# Patient Record
Sex: FEMALE | Race: WHITE | NOT HISPANIC OR LATINO | Employment: FULL TIME | URBAN - METROPOLITAN AREA
[De-identification: names, ages, dates, MRNs, and addresses within clinical notes are randomized per-mention and may not be internally consistent; named-entity substitution may affect disease eponyms.]

---

## 2019-02-15 ENCOUNTER — HOSPITAL ENCOUNTER (OUTPATIENT)
Dept: RADIOLOGY | Facility: MEDICAL CENTER | Age: 54
End: 2019-02-15
Attending: FAMILY MEDICINE
Payer: COMMERCIAL

## 2019-02-15 DIAGNOSIS — J20.9 ACUTE BRONCHITIS, UNSPECIFIED ORGANISM: ICD-10-CM

## 2019-02-15 PROCEDURE — 71046 X-RAY EXAM CHEST 2 VIEWS: CPT

## 2019-03-10 ENCOUNTER — APPOINTMENT (OUTPATIENT)
Dept: RADIOLOGY | Facility: IMAGING CENTER | Age: 54
End: 2019-03-10
Attending: NURSE PRACTITIONER
Payer: COMMERCIAL

## 2019-03-10 ENCOUNTER — APPOINTMENT (OUTPATIENT)
Dept: RADIOLOGY | Facility: MEDICAL CENTER | Age: 54
End: 2019-03-10
Attending: EMERGENCY MEDICINE
Payer: COMMERCIAL

## 2019-03-10 ENCOUNTER — HOSPITAL ENCOUNTER (EMERGENCY)
Facility: MEDICAL CENTER | Age: 54
End: 2019-03-10
Attending: EMERGENCY MEDICINE
Payer: COMMERCIAL

## 2019-03-10 ENCOUNTER — OFFICE VISIT (OUTPATIENT)
Dept: URGENT CARE | Facility: PHYSICIAN GROUP | Age: 54
End: 2019-03-10
Payer: COMMERCIAL

## 2019-03-10 VITALS
SYSTOLIC BLOOD PRESSURE: 128 MMHG | HEIGHT: 64 IN | HEART RATE: 88 BPM | BODY MASS INDEX: 40.97 KG/M2 | RESPIRATION RATE: 18 BRPM | TEMPERATURE: 99.3 F | DIASTOLIC BLOOD PRESSURE: 90 MMHG | WEIGHT: 240 LBS | OXYGEN SATURATION: 96 %

## 2019-03-10 VITALS
SYSTOLIC BLOOD PRESSURE: 135 MMHG | OXYGEN SATURATION: 97 % | HEIGHT: 64 IN | DIASTOLIC BLOOD PRESSURE: 52 MMHG | TEMPERATURE: 97.9 F | WEIGHT: 238.1 LBS | RESPIRATION RATE: 19 BRPM | HEART RATE: 65 BPM | BODY MASS INDEX: 40.65 KG/M2

## 2019-03-10 DIAGNOSIS — R06.02 SHORTNESS OF BREATH: ICD-10-CM

## 2019-03-10 LAB
ALBUMIN SERPL BCP-MCNC: 3.9 G/DL (ref 3.2–4.9)
ALBUMIN/GLOB SERPL: 1.2 G/DL
ALP SERPL-CCNC: 62 U/L (ref 30–99)
ALT SERPL-CCNC: 35 U/L (ref 2–50)
ANION GAP SERPL CALC-SCNC: 8 MMOL/L (ref 0–11.9)
AST SERPL-CCNC: 34 U/L (ref 12–45)
BASOPHILS # BLD AUTO: 0.4 % (ref 0–1.8)
BASOPHILS # BLD: 0.04 K/UL (ref 0–0.12)
BILIRUB SERPL-MCNC: 0.7 MG/DL (ref 0.1–1.5)
BNP SERPL-MCNC: 33 PG/ML (ref 0–100)
BUN SERPL-MCNC: 11 MG/DL (ref 8–22)
CALCIUM SERPL-MCNC: 8.9 MG/DL (ref 8.4–10.2)
CHLORIDE SERPL-SCNC: 105 MMOL/L (ref 96–112)
CO2 SERPL-SCNC: 21 MMOL/L (ref 20–33)
CREAT SERPL-MCNC: 0.88 MG/DL (ref 0.5–1.4)
EOSINOPHIL # BLD AUTO: 0.26 K/UL (ref 0–0.51)
EOSINOPHIL NFR BLD: 2.5 % (ref 0–6.9)
ERYTHROCYTE [DISTWIDTH] IN BLOOD BY AUTOMATED COUNT: 43.7 FL (ref 35.9–50)
GLOBULIN SER CALC-MCNC: 3.3 G/DL (ref 1.9–3.5)
GLUCOSE SERPL-MCNC: 96 MG/DL (ref 65–99)
HCT VFR BLD AUTO: 45.2 % (ref 37–47)
HGB BLD-MCNC: 15.2 G/DL (ref 12–16)
IMM GRANULOCYTES # BLD AUTO: 0.02 K/UL (ref 0–0.11)
IMM GRANULOCYTES NFR BLD AUTO: 0.2 % (ref 0–0.9)
LYMPHOCYTES # BLD AUTO: 2.98 K/UL (ref 1–4.8)
LYMPHOCYTES NFR BLD: 28.3 % (ref 22–41)
MCH RBC QN AUTO: 29.3 PG (ref 27–33)
MCHC RBC AUTO-ENTMCNC: 33.6 G/DL (ref 33.6–35)
MCV RBC AUTO: 87.3 FL (ref 81.4–97.8)
MONOCYTES # BLD AUTO: 0.63 K/UL (ref 0–0.85)
MONOCYTES NFR BLD AUTO: 6 % (ref 0–13.4)
NEUTROPHILS # BLD AUTO: 6.6 K/UL (ref 2–7.15)
NEUTROPHILS NFR BLD: 62.6 % (ref 44–72)
NRBC # BLD AUTO: 0 K/UL
NRBC BLD-RTO: 0 /100 WBC
PLATELET # BLD AUTO: 312 K/UL (ref 164–446)
PMV BLD AUTO: 9.6 FL (ref 9–12.9)
POTASSIUM SERPL-SCNC: 3.6 MMOL/L (ref 3.6–5.5)
PROT SERPL-MCNC: 7.2 G/DL (ref 6–8.2)
RBC # BLD AUTO: 5.18 M/UL (ref 4.2–5.4)
SODIUM SERPL-SCNC: 134 MMOL/L (ref 135–145)
TROPONIN I SERPL-MCNC: <0.02 NG/ML (ref 0–0.04)
WBC # BLD AUTO: 10.5 K/UL (ref 4.8–10.8)

## 2019-03-10 PROCEDURE — 94640 AIRWAY INHALATION TREATMENT: CPT | Performed by: NURSE PRACTITIONER

## 2019-03-10 PROCEDURE — 83880 ASSAY OF NATRIURETIC PEPTIDE: CPT

## 2019-03-10 PROCEDURE — 80053 COMPREHEN METABOLIC PANEL: CPT

## 2019-03-10 PROCEDURE — 99284 EMERGENCY DEPT VISIT MOD MDM: CPT

## 2019-03-10 PROCEDURE — 700117 HCHG RX CONTRAST REV CODE 255: Performed by: EMERGENCY MEDICINE

## 2019-03-10 PROCEDURE — 71275 CT ANGIOGRAPHY CHEST: CPT

## 2019-03-10 PROCEDURE — 85025 COMPLETE CBC W/AUTO DIFF WBC: CPT

## 2019-03-10 PROCEDURE — 71046 X-RAY EXAM CHEST 2 VIEWS: CPT | Mod: TC | Performed by: NURSE PRACTITIONER

## 2019-03-10 PROCEDURE — 36415 COLL VENOUS BLD VENIPUNCTURE: CPT

## 2019-03-10 PROCEDURE — 99213 OFFICE O/P EST LOW 20 MIN: CPT | Mod: 25 | Performed by: NURSE PRACTITIONER

## 2019-03-10 PROCEDURE — 93005 ELECTROCARDIOGRAM TRACING: CPT | Performed by: EMERGENCY MEDICINE

## 2019-03-10 PROCEDURE — 84484 ASSAY OF TROPONIN QUANT: CPT

## 2019-03-10 PROCEDURE — 93000 ELECTROCARDIOGRAM COMPLETE: CPT | Performed by: NURSE PRACTITIONER

## 2019-03-10 RX ORDER — METHYLPREDNISOLONE 4 MG/1
TABLET ORAL
COMMUNITY
Start: 2019-02-15 | End: 2019-04-07

## 2019-03-10 RX ORDER — LEVOFLOXACIN 500 MG/1
TABLET, FILM COATED ORAL
COMMUNITY
Start: 2019-02-15 | End: 2019-04-07

## 2019-03-10 RX ORDER — BENZONATATE 100 MG/1
CAPSULE ORAL
COMMUNITY
Start: 2019-02-15 | End: 2019-04-07

## 2019-03-10 RX ORDER — IPRATROPIUM BROMIDE AND ALBUTEROL SULFATE 2.5; .5 MG/3ML; MG/3ML
3 SOLUTION RESPIRATORY (INHALATION) ONCE
Status: COMPLETED | OUTPATIENT
Start: 2019-03-10 | End: 2019-03-10

## 2019-03-10 RX ORDER — FLUCONAZOLE 150 MG/1
TABLET ORAL
COMMUNITY
Start: 2019-02-15 | End: 2019-04-07

## 2019-03-10 RX ADMIN — IOHEXOL 55 ML: 350 INJECTION, SOLUTION INTRAVENOUS at 19:45

## 2019-03-10 RX ADMIN — IPRATROPIUM BROMIDE AND ALBUTEROL SULFATE 3 ML: 2.5; .5 SOLUTION RESPIRATORY (INHALATION) at 17:16

## 2019-03-11 NOTE — ED NOTES
Pt rounding upon return from ct, vs as charted. Continues w/o s/s of distress. Call light in reach, await results

## 2019-03-11 NOTE — ED NOTES
Assumed care of pt-plan of care reviewed with pt to include ct , saline lock and blood draw. Blood to lab, call light in reach, in no apparent distress, vs as charted

## 2019-03-11 NOTE — DISCHARGE INSTRUCTIONS
Shortness of Breath, Adult  Shortness of breath is when a person has trouble breathing enough air, or when a person feels like she or he is having trouble breathing in enough air. Shortness of breath could be a sign of medical problem.  Follow these instructions at home:  Pay attention to any changes in your symptoms. Take these actions to help with your condition:  · Do not smoke. Smoking is a common cause of shortness of breath. If you smoke and you need help quitting, ask your health care provider.  · Avoid things that can irritate your airways, such as:  ¨ Mold.  ¨ Dust.  ¨ Air pollution.  ¨ Chemical fumes.  ¨ Things that can cause allergy symptoms (allergens), if you have allergies.  · Keep your living space clean and free of mold and dust.  · Rest as needed. Slowly return to your usual activities.  · Take over-the-counter and prescription medicines, including oxygen and inhaled medicines, only as told by your health care provider.  · Keep all follow-up visits as told by your health care provider. This is important.  Contact a health care provider if:  · Your condition does not improve as soon as expected.  · You have a hard time doing your normal activities, even after you rest.  · You have new symptoms.  Get help right away if:  · Your shortness of breath gets worse.  · You have shortness of breath when you are resting.  · You feel light-headed or you faint.  · You have a cough that is not controlled with medicines.  · You cough up blood.  · You have pain with breathing.  · You have pain in your chest, arms, shoulders, or abdomen.  · You have a fever.  · You cannot walk up stairs or exercise the way that you normally do.  This information is not intended to replace advice given to you by your health care provider. Make sure you discuss any questions you have with your health care provider.  Document Released: 09/12/2002 Document Revised: 07/08/2017 Document Reviewed: 05/25/2017  [employer] Patient  Education © 2017 [employer]

## 2019-03-11 NOTE — ED NOTES
"This pt is referred to our facility from [facility] for further evaluation and management of exertional dyspnea, and while at rest worsening for the past 2 days.  She states a Hx of Asthma with frequent exacerbations.  Chief Complaint   Patient presents with   • Asthma   • Shortness of Breath     /52   Pulse 78   Temp 36.6 °C (97.9 °F) (Temporal)   Resp 20   Ht 1.626 m (5' 4\")   Wt 108 kg (238 lb 1.6 oz)   LMP 03/03/2019 (Approximate)   SpO2 99%   BMI 40.87 kg/m²       "

## 2019-03-11 NOTE — PROGRESS NOTES
Chief Complaint   Patient presents with   • Shortness of Breath     Pt feels SOB with minor activity.        HISTORY OF PRESENT ILLNESS: Patient is a 53 y.o. female who presents to urgent care today with complaints of shortness of breath for the past two days. She admits to shortness of breath at rest and with activity. She became lightheaded this afternoon and almost passed out.  This concerned her and decided to come to urgent care at that point for further evaluation.  She denies any specific chest pain that does note bilateral pressure and difficulty taking a full deep breath.  She denies associated fever, cough, URI symptoms, leg pain, leg swelling.  She admits to a history of asthma, this does not feel similar.  She has been using her inhaler without improvement of her symptoms.  She does admit to taking a long road trip last month.  She does not smoke or take hormone therapy.  She denies chance of pregnancy, was tested for pregnancy last week which was negative.  And became concerned.  She denies any significant cardiac history.        There are no active problems to display for this patient.      Allergies:Erythromycin    Current Outpatient Prescriptions Ordered in [facility]   Medication Sig Dispense Refill   • PROAIR  (90 Base) MCG/ACT Aero Soln inhalation aerosol      • benzonatate (TESSALON) 100 MG Cap      • fluconazole (DIFLUCAN) 150 MG tablet      • levoFLOXacin (LEVAQUIN) 500 MG tablet      • MethylPREDNISolone (MEDROL DOSEPAK) 4 MG Tablet Therapy Pack      • doxycycline (VIBRAMYCIN) 100 MG Tab Take 1 Tab by mouth 2 times a day. 20 Tab 0     No current Epic-ordered facility-administered medications on file.        History reviewed. No pertinent past medical history.    Social History   Substance Use Topics   • Smoking status: Never Smoker   • Smokeless tobacco: Not on file   • Alcohol use Yes       No family status information on file.     Family History   Problem Relation Age of Onset   • Stroke  "Neg Hx    • Heart Disease Neg Hx        ROS:  Review of Systems   Constitutional: Negative for fever, chills, weight loss, malaise, and fatigue.   HENT: Negative for ear pain, nosebleeds, congestion, sore throat and neck pain.    Eyes: Negative for vision changes.   Neuro: Positive for weakness and near syncope today.  Negative for headache, sensory changes, weakness, seizure, LOC.   Cardiovascular: Negative for chest pain, palpitations, orthopnea and leg swelling.   Respiratory: Positive for shortness of breath.  Negative for cough, sputum production, and wheezing.   Gastrointestinal: Negative for abdominal pain, nausea, vomiting or diarrhea.   Genitourinary: Negative for dysuria, urgency and frequency.  Musculoskeletal: Negative for falls, neck pain, back pain, joint pain, myalgias.   Skin: Negative for rash, diaphoresis.     Exam:  Blood pressure 128/90, pulse 88, temperature 37.4 °C (99.3 °F), temperature source Temporal, resp. rate 18, height 1.626 m (5' 4\"), weight 108.9 kg (240 lb), SpO2 96 %.  General: well-nourished, well-developed female in NAD  Head: normocephalic, atraumatic  Eyes: PERRLA, no conjunctival injection, acuity grossly intact, lids normal.  Ears: normal shape and symmetry, no tenderness, no discharge. External canals are without any significant edema or erythema. Tympanic membranes are without any inflammation, no effusion. Gross auditory acuity is intact.  Nose: symmetrical without tenderness, no discharge.  Mouth/Throat: reasonable hygiene, no erythema, exudates or tonsillar enlargement.  Neck: no masses, range of motion within normal limits, no tracheal deviation. No obvious thyroid enlargement.   Lymph: no cervical adenopathy. No supraclavicular adenopathy.   Neuro: alert and oriented. Cranial nerves 1-12 grossly intact. No sensory deficit.   Cardiovascular: regular rate and rhythm. No edema.  Pulmonary: Chest is symmetrical with respiration, no wheezes, crackles, or rhonchi.  Patient is " "speaking in short sentences, without distress.  Musculoskeletal: no clubbing, appropriate muscle tone, gait is stable.  Skin: warm, dry, intact, no clubbing, no cyanosis, no rashes.   Psych: appropriate mood, affect, judgement.         Assessment/Plan:  1. Shortness of breath  ipratropium-albuterol (DUONEB) nebulizer solution    DX-CHEST-2 VIEWS    EKG         12-lead study EKG interpretation, interpreted by myself.  The rhythm is sinus with a rate of 67.  There is no ectopy. There are no acute ST segment changes and no T wave abnormalities.  Interpretation: No ST segment elevation myocardial infarction.      DX chest reviewed by myself, radiology reading \"No acute cardiopulmonary findings.\"        Patient is a pleasant 53-year-old female who presents to the clinic today with several days of shortness of breath.  She does have a history of asthma but feels that this is not similar.  She is given a DuoNeb in clinic without improvement of her symptoms.  Her chest x-ray is negative today for any obvious abnormalities.  Her twelve-lead EKG is without any ST segment changes.  She does appear somewhat air hungry upon examination but is without any acute distress. At this time, I feel the patient requires a higher level of care including closer monitoring, stat lab work and/or imaging for further evaluation. This has been discussed with the patient and she states agreement and understanding.  I discussed with her that I recommend EMS transport at this time. However, patient is deciding against medical advice. The patient will be driven directly to her emergency department of choice, she is in no acute distress upon departure, by her s/o.             Please note that this dictation was created using voice recognition software. I have made every reasonable attempt to correct obvious errors, but I expect that there are errors of grammar and possibly content that I did not discover before finalizing the note.      Patricia Rojas, " HENRY

## 2019-04-07 ENCOUNTER — OFFICE VISIT (OUTPATIENT)
Dept: URGENT CARE | Facility: PHYSICIAN GROUP | Age: 54
End: 2019-04-07
Payer: COMMERCIAL

## 2019-04-07 VITALS
SYSTOLIC BLOOD PRESSURE: 134 MMHG | HEIGHT: 64 IN | DIASTOLIC BLOOD PRESSURE: 78 MMHG | WEIGHT: 230 LBS | RESPIRATION RATE: 18 BRPM | OXYGEN SATURATION: 92 % | BODY MASS INDEX: 39.27 KG/M2 | HEART RATE: 77 BPM | TEMPERATURE: 98.5 F

## 2019-04-07 DIAGNOSIS — J45.21 MILD INTERMITTENT ASTHMA WITH EXACERBATION: ICD-10-CM

## 2019-04-07 PROCEDURE — 99214 OFFICE O/P EST MOD 30 MIN: CPT | Performed by: PHYSICIAN ASSISTANT

## 2019-04-07 RX ORDER — PREDNISONE 20 MG/1
TABLET ORAL
Qty: 21 TAB | Refills: 0 | Status: SHIPPED
Start: 2019-04-07 | End: 2020-01-13

## 2019-04-07 RX ORDER — ALBUTEROL SULFATE 90 UG/1
2 AEROSOL, METERED RESPIRATORY (INHALATION) EVERY 6 HOURS PRN
Qty: 8.5 G | Refills: 0 | Status: SHIPPED
Start: 2019-04-07 | End: 2020-01-13

## 2019-04-07 NOTE — PROGRESS NOTES
Chief Complaint   Patient presents with   • Cough     Hx of asthma x 1 week cough cant catch breath        HISTORY OF PRESENT ILLNESS: Patient is a 53 y.o. female who presents today for about 1 week of worsening SOB/asthma symptoms with about 2 months overall of worsening asthma symptoms.  She has hx of asthma since she was 7 with it being very well controlled up until recently.   She was seen in  and ER on 03/10 where PE was ruled out and then followed up with her PCP who has her on nighttime oxygen and sched to see Pulmonologist on April 29th.  She felt with her worsening nighttime symptoms she could not wait.   She does well in the mornings after wearing oxygen but by then end of the day and with strenuous activity her wheezing and SOB worsens.  She has some coughing but denies fevers, sputum production or chest pain.   She has not had any recent steroid treatment.     There are no active problems to display for this patient.      Allergies:Erythromycin    Current Outpatient Prescriptions Ordered in [facility]   Medication Sig Dispense Refill   • predniSONE (DELTASONE) 20 MG Tab 3 tablets x 3 days, 2 tablets x 4 days, 1 tablet x 4 days then off. 21 Tab 0   • albuterol 108 (90 Base) MCG/ACT Aero Soln inhalation aerosol Inhale 2 Puffs by mouth every 6 hours as needed. 8.5 g 0     No current Epic-ordered facility-administered medications on file.        Past Medical History:   Diagnosis Date   • Asthma        Social History   Substance Use Topics   • Smoking status: Never Smoker   • Smokeless tobacco: Never Used   • Alcohol use Yes      Comment: Occasionally       No family status information on file.     Family History   Problem Relation Age of Onset   • Stroke Neg Hx    • Heart Disease Neg Hx        ROS:  Review of Systems   Constitutional: Negative for fever, chills, weight loss and malaise/fatigue.   HENT: Negative for ear pain, nosebleeds, congestion, sore throat and neck pain.    Eyes: Negative for blurred vision.  "  Respiratory: SEE HPI  Cardiovascular: Negative for chest pain, palpitations, orthopnea and leg swelling.   Gastrointestinal: Negative for heartburn, nausea, vomiting and abdominal pain.       Exam:  /78   Pulse 77   Temp 36.9 °C (98.5 °F) (Temporal)   Resp 18   Ht 1.626 m (5' 4\")   Wt 104.3 kg (230 lb)   SpO2 92%   General:  Well nourished, well developed female in NAD  Eyes: PERRLA, EOM within normal limits, no conjunctival injection, no scleral icterus, visual fields and acuity grossly intact.  Ears: Normal shape and symmetry, no tenderness, no discharge. External canals are without any significant edema or erythema. Tympanic membranes are without any inflammation, no effusion. Gross auditory acuity is intact  Nose: Symmetrical, sinuses without tenderness, no discharge.   Mouth: reasonable hygiene, no erythema exudates or tonsillar enlargement.  Neck: no masses, range of motion within normal limits, no tracheal deviation. No lymphadenopathy  Pulmonary: Normal respiratory effort, few scattered end exp wheezes without crackles or rhonchi.   Cardiovascular: regular rate and rhythm without murmurs, rubs, or gallops.  Skin: No visible rashes or lesion. Warm, pink, dry.   Extremities: no clubbing, cyanosis, or edema.  Neuro: A&O x 3. Speech normal/clear.  Normal gait.         Assessment/Plan:  1. Mild intermittent asthma with exacerbation  predniSONE (DELTASONE) 20 MG Tab    albuterol 108 (90 Base) MCG/ACT Aero Soln inhalation aerosol         -Pred taper for asthma exacerbation and renewal of albuterol HFA  -patient will keep her Pulm appt for end of the month with RTC and ER precautions in the meantime.          Supportive care, differential diagnoses, and indications for immediate follow-up discussed with patient.   Pathogenesis of diagnosis discussed including typical length and natural progression.   Instructed to return to clinic or nearest emergency department for any change in condition, further " concerns, or worsening of symptoms.  Patient states understanding of the plan of care and discharge instructions.        Maureen West P.A.-C.

## 2019-04-29 ENCOUNTER — OFFICE VISIT (OUTPATIENT)
Dept: PULMONOLOGY | Facility: HOSPICE | Age: 54
End: 2019-04-29
Payer: COMMERCIAL

## 2019-04-29 VITALS
WEIGHT: 240 LBS | HEART RATE: 75 BPM | SYSTOLIC BLOOD PRESSURE: 130 MMHG | HEIGHT: 64 IN | RESPIRATION RATE: 16 BRPM | DIASTOLIC BLOOD PRESSURE: 60 MMHG | OXYGEN SATURATION: 92 % | BODY MASS INDEX: 40.97 KG/M2

## 2019-04-29 DIAGNOSIS — G47.33 OSA (OBSTRUCTIVE SLEEP APNEA): ICD-10-CM

## 2019-04-29 DIAGNOSIS — J45.909 UNCOMPLICATED ASTHMA, UNSPECIFIED ASTHMA SEVERITY, UNSPECIFIED WHETHER PERSISTENT: ICD-10-CM

## 2019-04-29 PROCEDURE — 99204 OFFICE O/P NEW MOD 45 MIN: CPT | Performed by: INTERNAL MEDICINE

## 2019-04-29 NOTE — PROGRESS NOTES
CC:  Chief Complaint   Patient presents with   • New Patient     REF CHARLOTTE VILLAGRAN    • Shortness of Breath     Dyspnea on exertion    HPI:   The patient is a 53 y.o. female.  With history of childhood asthma when she was living in [place], she grew over it over time and did not have any problem for more than 20 years.  However last November the patient had cold-like symptoms and since then she developed cough productive of white-yellow sputum wheezing and dyspnea on exertion.  The patient noticed that dairy products make her symptoms worse.  She started whole food plant-based diet for the last few weeks and noticed significant improvement of her symptoms.  The patient is not 100% back to her baseline but she is much better in terms of dyspnea on exertion and wheezing from 2 weeks ago.    The patient was also referred to us for evaluation of sleep apnea, she snores at night and she is sleepy during the day sometimes she cannot control herself from taking naps in the afternoon.    ROS:   Constitutional: Denies fevers, chills, night sweats  Eyes: Denies vision loss, pain, drainage, double vision  Ears, Nose, Throat: Denies earache, difficulty hearing, tinnitus, nasal congestion, hoarseness  Cardiovascular: Denies chest pain, tightness, palpitations, orthopnea or edema  Respiratory: Please see HPI  Sleep: Please see HPI  GI: Denies heartburn, dysphagia, nausea, abdominal pain, diarrhea or constipation  : Denies frequent urination, hematuria, discharge or painful urination  Musculoskeletal: Denies back pain, painful joints, sore muscles  Neurological: Denies weakness or headaches  Skin: No rashes  All other ROS were negative except what mentioned in the HPI     Past Medical History:  Past Medical History:   Diagnosis Date   • Asthma                Social History:  Social History     Social History   • Marital status:      Spouse name: N/A   • Number of children: N/A   • Years of education: N/A  "    Occupational History   • Not on file.     Social History Main Topics   • Smoking status: Never Smoker   • Smokeless tobacco: Never Used   • Alcohol use Yes      Comment: Occasionally   • Drug use: Unknown   • Sexual activity: Not on file     Other Topics Concern   • Not on file     Social History Narrative   • No narrative on file             Home Pets   The patient has a dog    Family History:  Family History   Problem Relation Age of Onset   • Stroke Neg Hx    • Heart Disease Neg Hx        Current Outpatient Prescriptions on File Prior to Visit   Medication Sig Dispense Refill   • predniSONE (DELTASONE) 20 MG Tab 3 tablets x 3 days, 2 tablets x 4 days, 1 tablet x 4 days then off. (Patient not taking: Reported on 4/29/2019) 21 Tab 0   • albuterol 108 (90 Base) MCG/ACT Aero Soln inhalation aerosol Inhale 2 Puffs by mouth every 6 hours as needed. 8.5 g 0     No current facility-administered medications on file prior to visit.        Allergies:   Erythromycin        Vitals:    04/29/19 1449   Height: 1.626 m (5' 4\")   Weight: 108.9 kg (240 lb)   Weight % change since last entry.: 0 %   BP: 130/60   Pulse: 75   BMI (Calculated): 41.2   Resp: 16           Physical Exam:  Appearance:  in no acute distress  HEENT: Normocephalic, atraumatic, white sclera, PERRLA, oropharynx clear  Neck: No adenopathy or masses  Respiratory: no intercostal retractions or accessory muscle use  Lungs auscultation: Clear to auscultation bilaterally  Cardiovascular: Regular rate rhythm. No murmurs, rubs or gallops.  No LE edema  Abdomen: soft, nondistended  Gait: Normal  Digits: No clubbing, cyanosis  Motor: No focal deficits  Orientation: Oriented to time, person and place      DATA:    Labs:  Lab Results   Component Value Date/Time    WBC 10.5 03/10/2019 06:44 PM    RBC 5.18 03/10/2019 06:44 PM    HEMOGLOBIN 15.2 03/10/2019 06:44 PM    HEMATOCRIT 45.2 03/10/2019 06:44 PM    MCV 87.3 03/10/2019 06:44 PM    MCH 29.3 03/10/2019 06:44 PM    " MCHC 33.6 03/10/2019 06:44 PM    MPV 9.6 03/10/2019 06:44 PM    NEUTSPOLYS 62.60 03/10/2019 06:44 PM    LYMPHOCYTES 28.30 03/10/2019 06:44 PM    MONOCYTES 6.00 03/10/2019 06:44 PM    EOSINOPHILS 2.50 03/10/2019 06:44 PM    BASOPHILS 0.40 03/10/2019 06:44 PM      Lab Results   Component Value Date/Time    SODIUM 134 (L) 03/10/2019 06:44 PM    POTASSIUM 3.6 03/10/2019 06:44 PM    CHLORIDE 105 03/10/2019 06:44 PM    CO2 21 03/10/2019 06:44 PM    GLUCOSE 96 03/10/2019 06:44 PM    BUN 11 03/10/2019 06:44 PM    CREATININE 0.88 03/10/2019 06:44 PM    CREATININE 0.9 02/07/2007 07:25 AM        Imaging:  Recent CT chest        Diagnosis:  1. Uncomplicated asthma, unspecified asthma severity, unspecified whether persistent  PULMONARY FUNCTION TESTS -Test requested: Complete Pulmonary Function Test   2. BOSTON (obstructive sleep apnea)  Polysomnography, 4 or More    REFERRAL TO OTHER   3. BMI 40.0-44.9, adult ([facility])  OBESITY COUNSELING (No Charge): Patient identified as having weight management issue.  Appropriate orders and counseling given.        Assessment and Plan   The patient symptoms are probably secondary to asthma with recent exacerbation.  For the last few weeks she has been improving.  She is not back to baseline but definitely doing much better than 2 weeks ago.  Because the patient is improving we will not start new treatment for her asthma like inhaled steroids at this point, until she comes next visit with pulmonary function test.  At that time if she continues to improve probably she will not need any further treatment other than albuterol inhaler  for her asthma.  But if symptoms get worse we will start inhaled steroids.    The patient probably also has obstructive sleep apnea with signs and symptoms consistent with that, I ordered polysomnography and referral to the sleep clinic.    Counseling and weight loss and active lifestyle and healthy diet was provided                  Return in about 4 weeks (around  5/27/2019) for With PFT.        This note was created using voice recognition software. I apologize for any overlooked obvious grammar or  vocabulary mistake

## 2019-05-02 LAB — EKG IMPRESSION: NORMAL

## 2019-06-26 ENCOUNTER — NON-PROVIDER VISIT (OUTPATIENT)
Dept: PULMONOLOGY | Facility: HOSPICE | Age: 54
End: 2019-06-26
Attending: INTERNAL MEDICINE
Payer: COMMERCIAL

## 2019-06-26 ENCOUNTER — OFFICE VISIT (OUTPATIENT)
Dept: PULMONOLOGY | Facility: HOSPICE | Age: 54
End: 2019-06-26
Payer: COMMERCIAL

## 2019-06-26 VITALS
HEIGHT: 65 IN | HEART RATE: 73 BPM | DIASTOLIC BLOOD PRESSURE: 72 MMHG | BODY MASS INDEX: 40.32 KG/M2 | WEIGHT: 242 LBS | OXYGEN SATURATION: 95 % | SYSTOLIC BLOOD PRESSURE: 128 MMHG

## 2019-06-26 VITALS — WEIGHT: 242 LBS | HEIGHT: 65 IN | BODY MASS INDEX: 40.32 KG/M2

## 2019-06-26 DIAGNOSIS — J45.909 MILD ASTHMA WITHOUT COMPLICATION, UNSPECIFIED WHETHER PERSISTENT: ICD-10-CM

## 2019-06-26 DIAGNOSIS — J45.909 UNCOMPLICATED ASTHMA, UNSPECIFIED ASTHMA SEVERITY, UNSPECIFIED WHETHER PERSISTENT: ICD-10-CM

## 2019-06-26 PROCEDURE — 99214 OFFICE O/P EST MOD 30 MIN: CPT | Performed by: INTERNAL MEDICINE

## 2019-06-26 ASSESSMENT — PULMONARY FUNCTION TESTS
FVC: 3.39
FEV1/FVC: 87
FEV1_PERCENT_PREDICTED: 102
FEV1: 2.85
FEV1_PERCENT_CHANGE: 3
FEV1_PERCENT_CHANGE: 3
FEV1/FVC_PERCENT_PREDICTED: 108
FEV1/FVC_PERCENT_CHANGE: 0
FEV1/FVC_PREDICTED: 80
FVC_PERCENT_PREDICTED: 95
FEV1/FVC_PERCENT_PREDICTED: 108
FEV1/FVC: 87
FEV1/FVC_PERCENT_CHANGE: 100
FEV1/FVC_PERCENT_PREDICTED: 79
FVC_PERCENT_PREDICTED: 92
FEV1/FVC: 87
FVC_PREDICTED: 3.54
FEV1/FVC_PERCENT_PREDICTED: 111
FVC: 3.27
FEV1_LLN: 2.32
FEV1/FVC: 86.73
FEV1/FVC_PERCENT_LLN: 67
FEV1_PERCENT_PREDICTED: 105
FEV1_PREDICTED: 2.78
FVC_LLN: 2.96
FEV1/FVC_PERCENT_PREDICTED: 111
FEV1: 2.94

## 2019-06-26 NOTE — PROCEDURES
Technician: GILBERTO Francois    Technician Comment:  Good patient effort & cooperation.  The results of this test meet the ATS/ERS standards for acceptability & reproducibility.  Test was performed on the Reppler Body Plethysmograph-Elite DX system.  Predicted values were [facility] for spirometry, [facility] for DLCO, ITS for Lung Volumes.  The DLCO was uncorrected for Hgb.  A bronchodilator of Ventolin HFA -2puffs via spacer administered.  DLCO performed during dilation period.    Interpretation:    Spirometry showed FVC of 3.39 L, 95% of predicted.  FEV1 was 2.94 L, 105% predicted.  FEV1/FVC ratio was 87%.  There was no response to bronchodilators.  Flow volume loop was normal in shape and size.    Lung volumes showed mild air trapping with residual volume of 141% predicted.  Total lung capacity was slightly increased 128% of predicted.    Diffusion capacity was increased to 143% of predicted.    Impression:  The patient has mild air trapping with mild decrease in diffusion capacities these findings could be secondary to asthma as listed in the patient diagnosis list.  Clinical correlation is required.

## 2019-06-26 NOTE — PROGRESS NOTES
CC:  Chief Complaint   Patient presents with   • Follow-Up     Last seen on 04/29/2019   • Results     PFT 06/26/2019     Follow-up appointment, suspected asthma    HPI:   The patient is a 54 y.o. female with a history of childhood asthma came today for follow-up.  The patient was initially referred to our office after having cough, wheezing and shortness of breath for more than 6 months with no significant improvement on antibiotics.  The patient was eventually treated with systemic steroids with some improvement.    When I saw the patient few months ago her symptoms were much better.  We did not prescribe any treatment at that time other than albuterol inhaler on as-needed basis.    Today the patient came to do pulmonary function test and to follow-up.  Her PFTs showed some air trapping otherwise no clear obstruction.    Clinically the patient is much better she does not have any cough or wheezing or any shortness of breath.  She has notes used the albuterol inhaler for more than 1 month now.      ROS:   Constitutional: Denies fevers, chills, night sweats  Eyes: Denies vision loss, pain, drainage, double vision  Ears, Nose, Throat: Denies earache, difficulty hearing, tinnitus, nasal congestion, hoarseness  Cardiovascular: Denies chest pain, tightness, palpitations, orthopnea or edema  Respiratory: Please see HPI  GI: Denies heartburn, dysphagia, nausea, abdominal pain, diarrhea or constipation  : Denies frequent urination, hematuria, discharge or painful urination  Musculoskeletal: Denies back pain, painful joints, sore muscles  Neurological: Denies weakness or headaches  Skin: No rashes  All other ROS were negative except what mentioned in the HPI     Past Medical History:  Past Medical History:   Diagnosis Date   • Asthma                Social History:  Social History     Social History   • Marital status:      Spouse name: N/A   • Number of children: N/A   • Years of education: N/A     Occupational  "History   • Not on file.     Social History Main Topics   • Smoking status: Never Smoker   • Smokeless tobacco: Never Used   • Alcohol use Yes      Comment: Occasionally   • Drug use: Unknown   • Sexual activity: Not on file     Other Topics Concern   • Not on file     Social History Narrative   • No narrative on file         Family History:  Family History   Problem Relation Age of Onset   • Stroke Neg Hx    • Heart Disease Neg Hx        Current Outpatient Prescriptions on File Prior to Visit   Medication Sig Dispense Refill   • predniSONE (DELTASONE) 20 MG Tab 3 tablets x 3 days, 2 tablets x 4 days, 1 tablet x 4 days then off. (Patient not taking: Reported on 4/29/2019) 21 Tab 0   • albuterol 108 (90 Base) MCG/ACT Aero Soln inhalation aerosol Inhale 2 Puffs by mouth every 6 hours as needed. 8.5 g 0     No current facility-administered medications on file prior to visit.        Allergies:   Erythromycin        Vitals:    06/26/19 1417 06/26/19 1424   Height: 1.638 m (5' 4.5\")    Weight: 109.8 kg (242 lb)    Weight % change since last entry.: 0 %    BP: 128/72    Pulse: 73    BMI (Calculated): 40.9    O2 sat % room air:  95 %           Physical Exam:  Appearance:  in no acute distress  HEENT: Normocephalic, atraumatic, white sclera, PERRLA, oropharynx clear  Neck: No adenopathy or masses  Respiratory: no intercostal retractions or accessory muscle use  Lungs auscultation: Clear to auscultation bilaterally  Cardiovascular: Regular rate rhythm. No murmurs, rubs or gallops.  No LE edema  Abdomen: soft, nondistended  Gait: Normal  Digits: No clubbing, cyanosis  Motor: No focal deficits  Orientation: Oriented to time, person and place      DATA:    Labs:  Lab Results   Component Value Date/Time    WBC 10.5 03/10/2019 06:44 PM    RBC 5.18 03/10/2019 06:44 PM    HEMOGLOBIN 15.2 03/10/2019 06:44 PM    HEMATOCRIT 45.2 03/10/2019 06:44 PM    MCV 87.3 03/10/2019 06:44 PM    MCH 29.3 03/10/2019 06:44 PM    MCHC 33.6 03/10/2019 " 06:44 PM    MPV 9.6 03/10/2019 06:44 PM    NEUTSPOLYS 62.60 03/10/2019 06:44 PM    LYMPHOCYTES 28.30 03/10/2019 06:44 PM    MONOCYTES 6.00 03/10/2019 06:44 PM    EOSINOPHILS 2.50 03/10/2019 06:44 PM    BASOPHILS 0.40 03/10/2019 06:44 PM      Lab Results   Component Value Date/Time    SODIUM 134 (L) 03/10/2019 06:44 PM    POTASSIUM 3.6 03/10/2019 06:44 PM    CHLORIDE 105 03/10/2019 06:44 PM    CO2 21 03/10/2019 06:44 PM    GLUCOSE 96 03/10/2019 06:44 PM    BUN 11 03/10/2019 06:44 PM    CREATININE 0.88 03/10/2019 06:44 PM    CREATININE 0.9 02/07/2007 07:25 AM          PULMONARY FUNCTION TEST:  Please see HPI        Diagnosis:  1. Mild asthma without complication, unspecified whether persistent          Assessment and Plan   The patient's symptoms are likely secondary to mild intermittent asthma.  Currently she does not have any symptoms.  She has not used albuterol inhaler for more than a month.    I reassured the patient's about the pulmonary function test finding.  I scheduled her to come back in 1 year sooner if she develops more symptoms and no treatment is needed for her asthma at this point.                      Return in about 1 year (around 6/26/2020).        This note was created using voice recognition software. I apologize for any overlooked obvious grammar or  vocabulary mistake

## 2019-07-02 PROCEDURE — 94729 DIFFUSING CAPACITY: CPT | Performed by: INTERNAL MEDICINE

## 2019-07-02 PROCEDURE — 94726 PLETHYSMOGRAPHY LUNG VOLUMES: CPT | Performed by: INTERNAL MEDICINE

## 2019-07-02 PROCEDURE — 94060 EVALUATION OF WHEEZING: CPT | Performed by: INTERNAL MEDICINE

## 2019-07-08 ENCOUNTER — APPOINTMENT (OUTPATIENT)
Dept: SLEEP MEDICINE | Facility: MEDICAL CENTER | Age: 54
End: 2019-07-08
Payer: COMMERCIAL

## 2019-07-19 ENCOUNTER — TELEPHONE (OUTPATIENT)
Dept: PULMONOLOGY | Facility: HOSPICE | Age: 54
End: 2019-07-19

## 2019-07-19 NOTE — TELEPHONE ENCOUNTER
1. Caller Name: Isabela                      Call Back Number: [phone] (home)       2. Message: Isabela called and said she is still wheezing and couhgin with phlegm. Pt said Dr Hinojosa he would prescribe her a Steroid inhaler without her being seen.  Told pt I will forward her request to Dr Hinojosa    3. Patient approves office to leave a detailed voicemail/MyChart message: N\A

## 2019-07-22 NOTE — TELEPHONE ENCOUNTER
Patient called and said that during her last OV with Dr. Hinojosa he said that if she need a steroid inhaler to call in.  Please advice.

## 2019-07-29 RX ORDER — METHYLPREDNISOLONE 4 MG/1
TABLET ORAL
Qty: 21 TAB | Refills: 0 | Status: SHIPPED
Start: 2019-07-29 | End: 2020-01-13

## 2019-07-29 RX ORDER — DOXYCYCLINE HYCLATE 100 MG/1
100 CAPSULE ORAL 2 TIMES DAILY
Qty: 20 CAP | Refills: 0 | Status: SHIPPED
Start: 2019-07-29 | End: 2020-01-13

## 2019-07-29 NOTE — PROGRESS NOTES
Ordered Breo.    Does she have any shortness of breath?  How often is she using her rescue inhaler? Has she used any steroid pill or antibiotic? What color is phlgem? Fever? Chest pain?

## 2019-07-29 NOTE — TELEPHONE ENCOUNTER
HENRY Hassan 2 hours ago (11:52 AM)        Ordered Breo.     Does she have any shortness of breath?  How often is she using her rescue inhaler? Has she used any steroid pill or antibiotic? What color is phlgem? Fever? Chest pain?

## 2019-07-29 NOTE — TELEPHONE ENCOUNTER
Called and spoke with pt. Notified pt Breo was ordered.    Pt said depending on her wheezing.  If she is wheezing really bad, she gets SOB.  She is using her rescuer inhaler about every 6 hours.  She has used a steroid inhaler before over 15 yrs ago.  No fever. No chest pain.  Her phlegm is Yellowish/greenish.  Pt said she had asthma stopped in her 30's then it started back up again this past year.

## 2019-07-29 NOTE — TELEPHONE ENCOUNTER
Called and spoke with pt. Notified pt Medrol Pack and Doxycyline was sent to her pharmacy.  If she does not feel any better in 4 days to call back and if sxs worsens then go to ER. Pt understood and was very appreciative of the call

## 2019-07-29 NOTE — TELEPHONE ENCOUNTER
She also needs a medrol pack and zpack to take at this time. She is having an exacerbation.  I will send to pharm. If she does not feel any better in 4 days call back. If symptoms worsen then go to ER.

## 2019-08-09 ENCOUNTER — OFFICE VISIT (OUTPATIENT)
Dept: PULMONOLOGY | Facility: HOSPICE | Age: 54
End: 2019-08-09
Payer: COMMERCIAL

## 2019-08-09 VITALS
WEIGHT: 240 LBS | HEART RATE: 74 BPM | OXYGEN SATURATION: 95 % | SYSTOLIC BLOOD PRESSURE: 124 MMHG | RESPIRATION RATE: 16 BRPM | DIASTOLIC BLOOD PRESSURE: 90 MMHG | BODY MASS INDEX: 40.97 KG/M2 | HEIGHT: 64 IN

## 2019-08-09 DIAGNOSIS — R40.0 DAYTIME SOMNOLENCE: ICD-10-CM

## 2019-08-09 DIAGNOSIS — J45.41 MODERATE PERSISTENT ASTHMA WITH EXACERBATION: ICD-10-CM

## 2019-08-09 PROCEDURE — 99214 OFFICE O/P EST MOD 30 MIN: CPT | Performed by: PHYSICIAN ASSISTANT

## 2019-08-09 RX ORDER — ALBUTEROL SULFATE 2.5 MG/3ML
2.5 SOLUTION RESPIRATORY (INHALATION) EVERY 4 HOURS PRN
Qty: 120 ML | Refills: 11 | Status: SHIPPED
Start: 2019-08-09 | End: 2020-01-13

## 2019-08-09 RX ORDER — PREDNISONE 10 MG/1
TABLET ORAL
Qty: 18 TAB | Refills: 0 | Status: SHIPPED
Start: 2019-08-09 | End: 2020-01-13

## 2019-08-09 ASSESSMENT — ENCOUNTER SYMPTOMS
HEARTBURN: 0
SORE THROAT: 0
INSOMNIA: 1
WHEEZING: 1
COUGH: 1
SHORTNESS OF BREATH: 1
DIZZINESS: 0
ORTHOPNEA: 0
HEADACHES: 1
SINUS PAIN: 0
CLAUDICATION: 0
FEVER: 0
EYES NEGATIVE: 1
DIAPHORESIS: 0
PALPITATIONS: 0
CHILLS: 0
WEIGHT LOSS: 0
TREMORS: 0
SPUTUM PRODUCTION: 1

## 2019-08-09 NOTE — PATIENT INSTRUCTIONS
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

## 2019-08-09 NOTE — PROGRESS NOTES
CC: Cough 6 weeks    HPI:  Isabela Alejandro is a 54 y.o. year old female here today for follow-up on asthma flareup.  Patient has a history of asthma childhood.  Last seen in clinic 6/26/2019 by Dr. Hinojosa.  She is upset that her sleep appointment she waited for 3 months to get was canceled during the recent insurance conflict.  Requesting referral to sleep center with appointment to be as soon as possible.  Patient is a never smoker.    Reviewed in clinic vitals including blood sugar 124/90, heart rate is at work, O2 sat of 95%. Patient's body mass index is 41.2 kg/m².   Exercise and nutrition counseling were performed at this visit.  Increase activity as tolerated.  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    Reviewed home medication regimen which includes medical which she is needing twice a day, Breo.  Patient did a course of doxycycline and Medrol Dosepak.  Initial benefit seen return of symptoms after completion of Medrol Dosepak.    Reviewed most recent imaging including CTA obtained 3/10/2019 which did not demonstrate any evidence of pulmonary embolus.  No evidence of suspicious nodules or airspace process.  Last chest x-ray was also 3/10/2019 with no acute cardiopulmonary findings.    Pulmonary function test obtained 6/26/2019 demonstrated FVC of 3.39 L or 95% predicted, FEV1 of 2.94L or 105% predicted, FEV1/FVC ratio 87.  No bronchodilator response, flow volume loop normal in size and shape, residual volume 141% predicted, TLC 28%, DLCO 140%.  Per pulmonologist interpretation patient has mild air trapping with mild increase in diffusion capacity which could be secondary to asthma.      Review of Systems   Constitutional: Positive for malaise/fatigue. Negative for chills, diaphoresis, fever and weight loss.   HENT: Positive for congestion and tinnitus (couple months). Negative for hearing loss, nosebleeds, sinus pain and sore throat.    Eyes: Negative.    Respiratory:  Positive for cough (6 weeks duration), sputum production (yellow was green ), shortness of breath (with activity) and wheezing.    Cardiovascular: Positive for chest pain (pulled muscle from coughing, more noticeable end of day). Negative for palpitations, orthopnea, claudication and leg swelling.   Gastrointestinal: Negative for heartburn.   Skin: Negative.    Neurological: Positive for headaches (am migraines). Negative for dizziness and tremors.   Psychiatric/Behavioral: The patient has insomnia (able to initiate sleep, difficulty maintaining ).        Past Medical History:   Diagnosis Date   • Asthma        History reviewed. No pertinent surgical history.    Family History   Problem Relation Age of Onset   • Stroke Neg Hx    • Heart Disease Neg Hx        Social History     Socioeconomic History   • Marital status:      Spouse name: Not on file   • Number of children: Not on file   • Years of education: Not on file   • Highest education level: Not on file   Occupational History   • Not on file   Social Needs   • Financial resource strain: Not on file   • Food insecurity:     Worry: Not on file     Inability: Not on file   • Transportation needs:     Medical: Not on file     Non-medical: Not on file   Tobacco Use   • Smoking status: Never Smoker   • Smokeless tobacco: Never Used   Substance and Sexual Activity   • Alcohol use: Yes     Comment: Occasionally   • Drug use: Not on file   • Sexual activity: Not on file   Lifestyle   • Physical activity:     Days per week: Not on file     Minutes per session: Not on file   • Stress: Not on file   Relationships   • Social connections:     Talks on phone: Not on file     Gets together: Not on file     Attends Scientology service: Not on file     Active member of club or organization: Not on file     Attends meetings of clubs or organizations: Not on file     Relationship status: Not on file   • Intimate partner violence:     Fear of current or ex partner: Not on file  "    Emotionally abused: Not on file     Physically abused: Not on file     Forced sexual activity: Not on file   Other Topics Concern   • Not on file   Social History Narrative   • Not on file       Allergies as of 08/09/2019 - Reviewed 06/26/2019   Allergen Reaction Noted   • Erythromycin Hives 05/11/2016        @Vital signs for this encounter:  Vitals:    08/09/19 0820   Height: 1.626 m (5' 4\")   Weight: 108.9 kg (240 lb)   Weight % change since last entry.: 0 %   BP: 124/90   Pulse: 74   BMI (Calculated): 41.2   Resp: 16       Current medications as of today   Current Outpatient Medications   Medication Sig Dispense Refill   • Fluticasone Furoate-Vilanterol (BREO ELLIPTA) 100-25 MCG/INH AEROSOL POWDER, BREATH ACTIVATED Inhale 1 Puff by mouth every day. Rinse mouth after use. 1 Each 3   • albuterol 108 (90 Base) MCG/ACT Aero Soln inhalation aerosol Inhale 2 Puffs by mouth every 6 hours as needed. 8.5 g 0   • methylPREDNISolone (MEDROL DOSEPAK) 4 MG Tablet Therapy Pack Take as directed. (Patient not taking: Reported on 8/9/2019) 21 Tab 0   • doxycycline (VIBRAMYCIN) 100 MG Cap Take 1 Cap by mouth 2 times a day. Take until gone. (Patient not taking: Reported on 8/9/2019) 20 Cap 0   • predniSONE (DELTASONE) 20 MG Tab 3 tablets x 3 days, 2 tablets x 4 days, 1 tablet x 4 days then off. (Patient not taking: Reported on 4/29/2019) 21 Tab 0     No current facility-administered medications for this visit.          Physical Exam:   Gen:           Alert and oriented, No apparent distress. Mood and affect     appropriate, normal interaction with provider.  Eyes:          sclere white, conjunctive moist.  Hearing:     Grossly intact.  Dentition:    Good dentition.  Oropharynx:   Tongue some white coating, posterior pharynx without erythema or exudate.  Neck:        Supple, trachea midline, no masses.  Respiratory Effort: No intercostal retractions or use of accessory muscles.   Lung Auscultation:      Coarse with expiratory " wheezing   CV:            Regular rate and rhythm. No edema. No murmurs, rubs or gallops.  Digits, Nails, Ext: No clubbing, cyanosis, petechiae, or nodes.   Skin:        No rashes, lesions or ulcers noted on back or exposed skin    surfaces.                     Assessment:  1. Moderate persistent asthma with exacerbation  predniSONE (DELTASONE) 10 MG Tab    albuterol (PROVENTIL) 2.5mg/3ml Nebu Soln solution for nebulization   2. Daytime somnolence  REFERRAL TO SLEEP STUDIES   3. BMI 40.0-44.9, adult (HCC)  OBESITY COUNSELING (No Charge): Patient identified as having weight management issue.  Appropriate orders and counseling given.       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 The accuracy of the dictation is limited to the abilities of the software. I expect there may be some errors of grammar and possibly content.

## 2019-09-20 ENCOUNTER — OFFICE VISIT (OUTPATIENT)
Dept: PULMONOLOGY | Facility: HOSPICE | Age: 54
End: 2019-09-20
Payer: COMMERCIAL

## 2019-09-20 VITALS
DIASTOLIC BLOOD PRESSURE: 80 MMHG | OXYGEN SATURATION: 94 % | RESPIRATION RATE: 16 BRPM | BODY MASS INDEX: 41.48 KG/M2 | WEIGHT: 243 LBS | SYSTOLIC BLOOD PRESSURE: 132 MMHG | HEIGHT: 64 IN | HEART RATE: 73 BPM

## 2019-09-20 DIAGNOSIS — J45.909 MILD ASTHMA WITHOUT COMPLICATION, UNSPECIFIED WHETHER PERSISTENT: ICD-10-CM

## 2019-09-20 DIAGNOSIS — Z23 NEED FOR VACCINATION: ICD-10-CM

## 2019-09-20 DIAGNOSIS — G47.33 OSA (OBSTRUCTIVE SLEEP APNEA): ICD-10-CM

## 2019-09-20 PROCEDURE — 90686 IIV4 VACC NO PRSV 0.5 ML IM: CPT | Performed by: PHYSICIAN ASSISTANT

## 2019-09-20 PROCEDURE — 90472 IMMUNIZATION ADMIN EACH ADD: CPT | Performed by: PHYSICIAN ASSISTANT

## 2019-09-20 PROCEDURE — 99214 OFFICE O/P EST MOD 30 MIN: CPT | Mod: 25 | Performed by: PHYSICIAN ASSISTANT

## 2019-09-20 PROCEDURE — 90732 PPSV23 VACC 2 YRS+ SUBQ/IM: CPT | Performed by: PHYSICIAN ASSISTANT

## 2019-09-20 PROCEDURE — 90471 IMMUNIZATION ADMIN: CPT | Performed by: PHYSICIAN ASSISTANT

## 2019-09-20 NOTE — PROGRESS NOTES
CC none    HPI:  Isabela Alejandro is a 54 y.o. year old female here today for follow-up on asthma exacerbation.  Patient reports feeling significantly better at this time. Last seen in clinic 8/9/2019.  Patient is a never smoker.    Reviewed in clinic vitals including blood pressure 132/80, heart rate 73, O2 sat of 94%. Patient's body mass index is 41.71 kg/m². Exercise and nutrition counseling were performed at this visit.  Discussion included effect of central adiposity on pulmonary function.    Reviewed home medication regimen previously was using Breo and albuterol nebulizer or inhaler.  Currently not taking any medication.  Discussed options should symptoms recur.    Reviewed most recent imaging including CTA 3/10/2019 for high pretest probability of PE.  No PE noted, no pleural effusions, no suspicious nodules or airspace process.    Pulmonary function testing obtained 6/26/2019 demonstrated an FEV1 of 2.94 L or 105% predicted, FVC of 3.39 L or 95% predicted, FEV1/FVC ratio of 87%, no significant response to bronchodilators, flow volume loop normal in shape and size.  Lung volume included residual volume of 141% predicted, total lung capacity of 128% predicted, DLCO 143% predicted.  Per pulmonologist interpretation mild air trapping with mild increase in diffusion capacities could be secondary to asthma.      Review of Systems   Constitutional: Positive for malaise/fatigue (mild ). Negative for chills, diaphoresis, fever and weight loss.   HENT: Positive for congestion (intermittent ) and tinnitus. Negative for hearing loss, nosebleeds, sinus pain and sore throat.    Eyes: Negative.         No prescription glasses   Respiratory: Positive for cough (occasional ) and sputum production (occasional ). Negative for shortness of breath and wheezing.    Cardiovascular: Negative for chest pain, palpitations, orthopnea, claudication and leg swelling.   Gastrointestinal: Negative for heartburn.        No dentures, no  difficulty swallowing    Skin: Negative.    Neurological: Negative for dizziness, tremors and headaches.   Psychiatric/Behavioral: The patient does not have insomnia.      Past Medical History:   Diagnosis Date   • Asthma        History reviewed. No pertinent surgical history.    Family History   Problem Relation Age of Onset   • Stroke Neg Hx    • Heart Disease Neg Hx        Social History     Socioeconomic History   • Marital status:      Spouse name: Not on file   • Number of children: Not on file   • Years of education: Not on file   • Highest education level: Not on file   Occupational History   • Not on file   Social Needs   • Financial resource strain: Not on file   • Food insecurity:     Worry: Not on file     Inability: Not on file   • Transportation needs:     Medical: Not on file     Non-medical: Not on file   Tobacco Use   • Smoking status: Never Smoker   • Smokeless tobacco: Never Used   Substance and Sexual Activity   • Alcohol use: Yes     Comment: Occasionally   • Drug use: Not on file   • Sexual activity: Not on file   Lifestyle   • Physical activity:     Days per week: Not on file     Minutes per session: Not on file   • Stress: Not on file   Relationships   • Social connections:     Talks on phone: Not on file     Gets together: Not on file     Attends Hoahaoism service: Not on file     Active member of club or organization: Not on file     Attends meetings of clubs or organizations: Not on file     Relationship status: Not on file   • Intimate partner violence:     Fear of current or ex partner: Not on file     Emotionally abused: Not on file     Physically abused: Not on file     Forced sexual activity: Not on file   Other Topics Concern   • Not on file   Social History Narrative   • Not on file       Allergies as of 09/20/2019 - Reviewed 09/20/2019   Allergen Reaction Noted   • Erythromycin Hives 05/11/2016        @Vital signs for this encounter:  Vitals:    09/20/19 1358   Height: 1.626  "m (5' 4\")   Weight: 110.2 kg (243 lb)   Weight % change since last entry.: 0 %   BP: 132/80   Pulse: 73   BMI (Calculated): 41.71   Resp: 16       Current medications as of today   Current Outpatient Medications   Medication Sig Dispense Refill   • predniSONE (DELTASONE) 10 MG Tab Take 30mg x 3 days, then take 20mg x 3 days, then take 10mg x 3 days, with food, then discontinue. (Patient not taking: Reported on 9/20/2019) 18 Tab 0   • albuterol (PROVENTIL) 2.5mg/3ml Nebu Soln solution for nebulization 3 mL by Nebulization route every four hours as needed for Shortness of Breath. (Patient not taking: Reported on 9/20/2019) 120 mL 11   • Fluticasone Furoate-Vilanterol (BREO ELLIPTA) 100-25 MCG/INH AEROSOL POWDER, BREATH ACTIVATED Inhale 1 Puff by mouth every day. Rinse mouth after use. (Patient not taking: Reported on 9/20/2019) 1 Each 3   • methylPREDNISolone (MEDROL DOSEPAK) 4 MG Tablet Therapy Pack Take as directed. (Patient not taking: Reported on 8/9/2019) 21 Tab 0   • doxycycline (VIBRAMYCIN) 100 MG Cap Take 1 Cap by mouth 2 times a day. Take until gone. (Patient not taking: Reported on 8/9/2019) 20 Cap 0   • predniSONE (DELTASONE) 20 MG Tab 3 tablets x 3 days, 2 tablets x 4 days, 1 tablet x 4 days then off. (Patient not taking: Reported on 4/29/2019) 21 Tab 0   • albuterol 108 (90 Base) MCG/ACT Aero Soln inhalation aerosol Inhale 2 Puffs by mouth every 6 hours as needed. (Patient not taking: Reported on 9/20/2019) 8.5 g 0     No current facility-administered medications for this visit.          Physical Exam:   Gen:           Alert and oriented, No apparent distress. Mood and affect     appropriate, normal interaction with provider.  Eyes:          sclere white, conjunctive moist.  Hearing:     Grossly intact.  Dentition:    Good dentition.  Oropharynx:   Tongue normal, posterior pharynx without erythema or exudate.  Neck:        Supple, trachea midline, no masses.  Respiratory Effort: No intercostal retractions " or use of accessory muscles.   Lung Auscultation:      Clear to auscultation bilaterally; no rales, rhonchi or wheezing.  CV:            Regular rate and rhythm. No edema. No murmurs, rubs or gallops.  Digits, Nails, Ext: No clubbing, cyanosis, petechiae, or nodes.   Skin:        No rashes, lesions or ulcers noted on back or exposed skin    surfaces.                     Assessment:  1. Mild asthma without complication, unspecified whether persistent   no longer has cats, significantly feeling better, off meds   2. Need for vaccination  Influenza Vaccine Quad Injection (PF)    Pneumococal Polysaccharide Vaccine 23-Valent =>1YO SQ/IM   3. BOSTON (obstructive sleep apnea)   has sleep testing pending in November 4. BMI 40.0-44.9, adult (HCC)  OBESITY COUNSELING (No Charge): Patient identified as having weight management issue.  Appropriate orders and counseling given.       Immunizations:    Flu: 9/20/2019  Pneumovax 23: 9/20/2019  Prevnar 13: Deferred    Plan:  1-asymptomatic at this time  2-prefers to trial no medication at current time unless symptoms resume  3-recommend beginning montelukast/Singulair should patient begin to exhibit symptoms of asthma  4-start with nasal spray if primarily nasal symptoms  5-has rescue inhaler and nebulizer but not requiring   6-follow up 6 months         This dictation was created using voice recognition software. The accuracy of the dictation is limited to the abilities of the software. I expect there may be some errors of grammar and possibly content.

## 2019-09-20 NOTE — PATIENT INSTRUCTIONS
1-asymptomatic at this time  2-prefers to trial no medication at current time unless symptoms resume  3-recommend beginning montelukast/Singulair should patient begin to exhibit symptoms of asthma  4-start with nasal spray if primarily nasal symptoms  5-has rescue inhaler and nebulizer but not requiring   6-follow up 6 months

## 2019-11-14 ENCOUNTER — SLEEP CENTER VISIT (OUTPATIENT)
Dept: SLEEP MEDICINE | Facility: MEDICAL CENTER | Age: 54
End: 2019-11-14
Payer: COMMERCIAL

## 2019-11-14 VITALS
HEIGHT: 63 IN | RESPIRATION RATE: 15 BRPM | HEART RATE: 73 BPM | OXYGEN SATURATION: 94 % | DIASTOLIC BLOOD PRESSURE: 82 MMHG | WEIGHT: 240 LBS | BODY MASS INDEX: 42.52 KG/M2 | SYSTOLIC BLOOD PRESSURE: 124 MMHG

## 2019-11-14 DIAGNOSIS — G47.30 SLEEP DISORDER BREATHING: ICD-10-CM

## 2019-11-14 PROCEDURE — 99204 OFFICE O/P NEW MOD 45 MIN: CPT | Performed by: FAMILY MEDICINE

## 2019-11-14 NOTE — PROGRESS NOTES
"     [facility]  Consult Note     Date: 11/14/2019 / Time: 8:31 AM    Patient ID:   Name:             Isabela Alejandro     YOB: 1965  Age:                 54 y.o.  female   MRN:               [id number]      Thank you for requesting a sleep medicine consultation on Isabela Alejandro at the sleep center. He presents today with the chief complaints of snoring, non restorative sleep and morning headache. She is referred by Luiza Ross for evaluation and treatment of sleep disorder breathing. Her comorbid condition include Asthma.    HISTORY OF PRESENT ILLNESS:     At night,  Isabela Alejandro goes to bed around 9 pm on weekdays and on the weekends. She gets out of bed at 5 am on weekdays and at 5-6 am on the weekends.  She averages about 8 hrs of sleep on a good night and 6 hrs on a bad night. Pt has bad nights about 2-3 nights per week. She falls asleep within few minutes. She awakens 1-2 times during the night. It takes her 15 min-2 hrs to fall back asleep.     She is aware of snoring but denies breathing pauses/gasping or choking in sleep.  She  denies any symptoms of restless legs syndrome such as an \"urge to move\"  She  legs in the evening or bedtime. She  denies any symptoms of narcolepsy such as sleep paralysis or cataplexy, or any symptoms to suggest parasomnias such as sleep walking or acting out of dreams. She  has not used any medications for the sleep problem.    Overall,She doesnot finds her sleep refreshing.  In terms of  excessive daytime sleepiness,  She denies of sleepiness while  at work, while reading or watching TV or while driving. [place] sleepiness scale score is normal at 4/24.She does not take regular naps.     Pulmonary function testing obtained 6/26/2019 demonstrated an FEV1 of 2.94 L or 105% predicted, FVC of 3.39 L or 95% predicted, FEV1/FVC ratio of 87%, no significant response to bronchodilators, flow volume loop normal in shape and size.  Lung volume included residual " volume of 141% predicted, total lung capacity of 128% predicted, DLCO 143% predicted    REVIEW OF SYSTEMS:       Constitutional: Denies fevers, Denies weight changes  Eyes: Denies changes in vision, no eye pain  Ears/Nose/Throat/Mouth: + chronic rhinitis  Cardiovascular: Denies chest pain or palpitations   Respiratory: Denies shortness of breath , Denies cough  Gastrointestinal/Hepatic: Denies abdominal pain, nausea, vomiting, diarrhea, constipation or GI bleeding   Genitourinary: Denies bladder dysfunction, dysuria or frequency  Musculoskeletal/Rheum: Denies  joint pain and swelling   Skin/Breast: Denies rash  Neurological: Denies headache, confusion, memory loss or focal weakness/parasthesias  Psychiatric: denies mood disorder     Comprehensive review of systems form is reviewed with the patient and is attached in the EMR.     PMH:  has a past medical history of Asthma and Chickenpox. She also has no past medical history of Cancer (HCC).  MEDS:   Current Outpatient Medications:   •  predniSONE (DELTASONE) 10 MG Tab, Take 30mg x 3 days, then take 20mg x 3 days, then take 10mg x 3 days, with food, then discontinue. (Patient not taking: Reported on 9/20/2019), Disp: 18 Tab, Rfl: 0  •  albuterol (PROVENTIL) 2.5mg/3ml Nebu Soln solution for nebulization, 3 mL by Nebulization route every four hours as needed for Shortness of Breath. (Patient not taking: Reported on 9/20/2019), Disp: 120 mL, Rfl: 11  •  Fluticasone Furoate-Vilanterol (BREO ELLIPTA) 100-25 MCG/INH AEROSOL POWDER, BREATH ACTIVATED, Inhale 1 Puff by mouth every day. Rinse mouth after use. (Patient not taking: Reported on 9/20/2019), Disp: 1 Each, Rfl: 3  •  methylPREDNISolone (MEDROL DOSEPAK) 4 MG Tablet Therapy Pack, Take as directed. (Patient not taking: Reported on 8/9/2019), Disp: 21 Tab, Rfl: 0  •  doxycycline (VIBRAMYCIN) 100 MG Cap, Take 1 Cap by mouth 2 times a day. Take until gone. (Patient not taking: Reported on 8/9/2019), Disp: 20 Cap, Rfl: 0  •  " predniSONE (DELTASONE) 20 MG Tab, 3 tablets x 3 days, 2 tablets x 4 days, 1 tablet x 4 days then off. (Patient not taking: Reported on 4/29/2019), Disp: 21 Tab, Rfl: 0  •  albuterol 108 (90 Base) MCG/ACT Aero Soln inhalation aerosol, Inhale 2 Puffs by mouth every 6 hours as needed. (Patient not taking: Reported on 9/20/2019), Disp: 8.5 g, Rfl: 0  ALLERGIES:   Allergies   Allergen Reactions   • Erythromycin Hives     Hives     SURGHX: History reviewed. No pertinent surgical history.  SOCHX:  reports that she has never smoked. She has never used smokeless tobacco. She reports current alcohol use. She reports previous drug use.   FH:   Family History   Problem Relation Age of Onset   • Stroke Neg Hx    • Heart Disease Neg Hx    • Sleep Apnea Neg Hx            Physical Exam:  Vitals/ General Appearance:   Weight/BMI: Body mass index is 42.51 kg/m².  /82 (BP Location: Left arm, Patient Position: Sitting, BP Cuff Size: Adult)   Pulse 73   Resp 15   Ht 1.6 m (5' 3\")   Wt 108.9 kg (240 lb)   SpO2 94%   Vitals:    11/14/19 0828   BP: 124/82   BP Location: Left arm   Patient Position: Sitting   BP Cuff Size: Adult   Pulse: 73   Resp: 15   SpO2: 94%   Weight: 108.9 kg (240 lb)   Height: 1.6 m (5' 3\")       Pt. is alert and oriented to time, place and person. Cooperative and in no apparent distress.       -Head: Atraumatic, normocephalic.   -. Nose: + inferior turbinate hypertophy, no septal deviation, no polyp.   -. Throat: Oropharynx appears crowded in that the palate is overhanging, uvula is not large, pharynx not inflamed.   -. Neck: Supple. No thyromegaly  -. Chest: Trachea central, no spine deformity   -. Lungs auscultation: B/L good air entry, vesicular breath sounds, no adventitious sounds  -. Heart auscultation: 1st and 2nd heart sounds normal, regular rhythm. No appreciable murmur.  - Extremities: no clubbing, no pedal edema.  - Skin: No rash  - NEUROLOGICAL EXAMINATION: On neurological exam, the patient " was alert and oriented x3. speech was clear and fluent without dysarthria.      INVESTIGATIONS:       ASSESSMENT AND PLAN     1. She  has symptoms of Obstructive Sleep Apnea (BOSTON).     The pathophysiology of BOSTON and the increased risk of cardiovascular morbidity from untreated BOSTON is discussed in detail with the patient.      We have discussed diagnostic options including in-laboratory, attended polysomnography and home sleep testing. We have also discussed treatment options including airway pressurization, reconstructive otolaryngologic surgery, dental appliances and weight management.       Subsequently,treatment options will be discussed based on the diagnostic study. Meanwhile, She is urged to avoid supine sleep, weight gain and alcoholic beverages since all of these can worsen BOSTON. She is cautioned against drowsy driving. If She feels sleepy while driving, She must pull over for a break/nap, rather than persist on the road, in the interest of She own safety and that of others on the road.    Plan  -  She  will be scheduled for an overnight HST to assess sleep related  breathing disorder.    2.Regarding treatment of other past medical problems and general health maintenance,  She is urged to follow up with PCP.

## 2019-11-21 ENCOUNTER — HOSPITAL ENCOUNTER (OUTPATIENT)
Dept: RADIOLOGY | Facility: MEDICAL CENTER | Age: 54
End: 2019-11-21
Attending: FAMILY MEDICINE
Payer: COMMERCIAL

## 2019-11-21 DIAGNOSIS — G43.919 INTRACTABLE MIGRAINE WITHOUT STATUS MIGRAINOSUS, UNSPECIFIED MIGRAINE TYPE: ICD-10-CM

## 2019-11-21 PROCEDURE — 700117 HCHG RX CONTRAST REV CODE 255: Performed by: FAMILY MEDICINE

## 2019-11-21 PROCEDURE — A9576 INJ PROHANCE MULTIPACK: HCPCS | Performed by: FAMILY MEDICINE

## 2019-11-21 PROCEDURE — 70553 MRI BRAIN STEM W/O & W/DYE: CPT

## 2019-11-21 RX ADMIN — GADOTERIDOL 20 ML: 279.3 INJECTION, SOLUTION INTRAVENOUS at 07:55

## 2019-12-05 ENCOUNTER — HOME STUDY (OUTPATIENT)
Dept: SLEEP MEDICINE | Facility: MEDICAL CENTER | Age: 54
End: 2019-12-05
Attending: FAMILY MEDICINE
Payer: COMMERCIAL

## 2019-12-05 DIAGNOSIS — G47.30 SLEEP DISORDER BREATHING: ICD-10-CM

## 2019-12-05 PROCEDURE — 95806 SLEEP STUDY UNATT&RESP EFFT: CPT | Performed by: FAMILY MEDICINE

## 2019-12-14 NOTE — PROCEDURES
DIAGNOSTIC HOME SLEEP TEST (HST) REPORT       PATIENT ID:  NAME:  Isabela Alejandro  MRN:               [id number]  YOB: 1965  DATE OF STUDY: 12/7/19      Impression:     This study shows evidence of:     1.Sever obstructive sleep apnea with  Respiratory Event Index (JEANNETTE) of 42.2 per hour. These findings are based on the recording time (flow evaluation time). It is not possible with this device to determine a traditional apnea+hypopnea index (AHI) for total sleep time since EEG channels are not available.     2. Nocturnal hypoxia: O2 Sat. bobbi was 57%, avg O2 was 89 % and baseline O2 at 97 %. O2 sat was below 89% for 115 min of the flow evaluation time. Avg HR 66 BPM.      TECHNICAL DESCRIPTION:  [employer] Device used was a type-III home study device. Home sleep study recording included: Airflow recording by nasal pressure transducer; Respiratory Effort by abdominal Respiratory Bands; O2 by finger oximetry. A position sensor and a snore channel was also used.    Scoring Criteria: A modification of the the AASM Manual for the Scoring of Sleep and Associated Events. Obstructive apnea was scored by cessation of airflow for at least 10 seconds with continuing respiratory effort.  Central apnea was scored by cessation of airflow for at least 10 seconds with no effort.  Hypopnea was scored by a 30% or more reduction in airflow for at least 10 seconds accompanied by an arterial oxygen desaturation of 3% or more.  (For Medicare patients, hypopneas were scored by a 30% or more reduction in airflow for at least 10 seconds accompanied by an arterial oxygen saturation of 4% or more, as required by their insurance, CMS.        General sleep summary: . Total recording time is 436 minutes and total flow evaluation time is 420 minutes. The patient spent 365 minutes in the supine position.    Respiratory events:    Apneas: 78 (Obstructive apnea index 10.1/hr, Central apnea index 1 /hr, mixed 0 /hour)  Hypopneas:  218    Recommendations:    1. CPAP titration study vs Auto CPAP trial .   2.   In general patients with sleep apnea are advised to avoid alcohol and sedatives and to not operate a motor vehicle while drowsy. In some cases alternative treatment options may prove effective in resolving sleep apnea in these options include upper airway surgery, the use of a dental orthotic or weight loss and positional therapy. Clinical correlation is required.         Krissy Guzman MD

## 2019-12-20 ENCOUNTER — HOSPITAL ENCOUNTER (OUTPATIENT)
Dept: LAB | Facility: MEDICAL CENTER | Age: 54
End: 2019-12-20
Attending: FAMILY MEDICINE
Payer: COMMERCIAL

## 2019-12-20 LAB
ALBUMIN SERPL BCP-MCNC: 4.3 G/DL (ref 3.2–4.9)
ALBUMIN/GLOB SERPL: 1.4 G/DL
ALP SERPL-CCNC: 61 U/L (ref 30–99)
ALT SERPL-CCNC: 27 U/L (ref 2–50)
ANION GAP SERPL CALC-SCNC: 9 MMOL/L (ref 0–11.9)
APPEARANCE UR: CLEAR
AST SERPL-CCNC: 25 U/L (ref 12–45)
BACTERIA #/AREA URNS HPF: ABNORMAL /HPF
BASOPHILS # BLD AUTO: 0.6 % (ref 0–1.8)
BASOPHILS # BLD: 0.04 K/UL (ref 0–0.12)
BILIRUB SERPL-MCNC: 0.5 MG/DL (ref 0.1–1.5)
BILIRUB UR QL STRIP.AUTO: NEGATIVE
BUN SERPL-MCNC: 18 MG/DL (ref 8–22)
CALCIUM SERPL-MCNC: 9.4 MG/DL (ref 8.5–10.5)
CHLORIDE SERPL-SCNC: 107 MMOL/L (ref 96–112)
CHOLEST SERPL-MCNC: 187 MG/DL (ref 100–199)
CO2 SERPL-SCNC: 25 MMOL/L (ref 20–33)
COLOR UR: YELLOW
CREAT SERPL-MCNC: 0.85 MG/DL (ref 0.5–1.4)
EOSINOPHIL # BLD AUTO: 0.29 K/UL (ref 0–0.51)
EOSINOPHIL NFR BLD: 4.6 % (ref 0–6.9)
EPI CELLS #/AREA URNS HPF: ABNORMAL /HPF
ERYTHROCYTE [DISTWIDTH] IN BLOOD BY AUTOMATED COUNT: 45.6 FL (ref 35.9–50)
FASTING STATUS PATIENT QL REPORTED: NORMAL
GLOBULIN SER CALC-MCNC: 3 G/DL (ref 1.9–3.5)
GLUCOSE SERPL-MCNC: 91 MG/DL (ref 65–99)
GLUCOSE UR STRIP.AUTO-MCNC: NEGATIVE MG/DL
HCT VFR BLD AUTO: 46.4 % (ref 37–47)
HDLC SERPL-MCNC: 58 MG/DL
HGB BLD-MCNC: 14.8 G/DL (ref 12–16)
HYALINE CASTS #/AREA URNS LPF: ABNORMAL /LPF
IMM GRANULOCYTES # BLD AUTO: 0.01 K/UL (ref 0–0.11)
IMM GRANULOCYTES NFR BLD AUTO: 0.2 % (ref 0–0.9)
KETONES UR STRIP.AUTO-MCNC: NEGATIVE MG/DL
LDLC SERPL CALC-MCNC: 107 MG/DL
LEUKOCYTE ESTERASE UR QL STRIP.AUTO: ABNORMAL
LYMPHOCYTES # BLD AUTO: 1.94 K/UL (ref 1–4.8)
LYMPHOCYTES NFR BLD: 30.9 % (ref 22–41)
MCH RBC QN AUTO: 29.7 PG (ref 27–33)
MCHC RBC AUTO-ENTMCNC: 31.9 G/DL (ref 33.6–35)
MCV RBC AUTO: 93 FL (ref 81.4–97.8)
MICRO URNS: ABNORMAL
MONOCYTES # BLD AUTO: 0.39 K/UL (ref 0–0.85)
MONOCYTES NFR BLD AUTO: 6.2 % (ref 0–13.4)
NEUTROPHILS # BLD AUTO: 3.61 K/UL (ref 2–7.15)
NEUTROPHILS NFR BLD: 57.5 % (ref 44–72)
NITRITE UR QL STRIP.AUTO: NEGATIVE
NRBC # BLD AUTO: 0 K/UL
NRBC BLD-RTO: 0 /100 WBC
PH UR STRIP.AUTO: 6 [PH] (ref 5–8)
PLATELET # BLD AUTO: 303 K/UL (ref 164–446)
PMV BLD AUTO: 10.6 FL (ref 9–12.9)
POTASSIUM SERPL-SCNC: 4.2 MMOL/L (ref 3.6–5.5)
PROT SERPL-MCNC: 7.3 G/DL (ref 6–8.2)
PROT UR QL STRIP: NEGATIVE MG/DL
RBC # BLD AUTO: 4.99 M/UL (ref 4.2–5.4)
RBC # URNS HPF: ABNORMAL /HPF
RBC UR QL AUTO: NEGATIVE
SODIUM SERPL-SCNC: 141 MMOL/L (ref 135–145)
SP GR UR STRIP.AUTO: 1.02
TRIGL SERPL-MCNC: 110 MG/DL (ref 0–149)
UROBILINOGEN UR STRIP.AUTO-MCNC: 0.2 MG/DL
WBC # BLD AUTO: 6.3 K/UL (ref 4.8–10.8)
WBC #/AREA URNS HPF: ABNORMAL /HPF

## 2019-12-20 PROCEDURE — 85025 COMPLETE CBC W/AUTO DIFF WBC: CPT

## 2019-12-20 PROCEDURE — 80061 LIPID PANEL: CPT

## 2019-12-20 PROCEDURE — 36415 COLL VENOUS BLD VENIPUNCTURE: CPT

## 2019-12-20 PROCEDURE — 81001 URINALYSIS AUTO W/SCOPE: CPT

## 2019-12-20 PROCEDURE — 82306 VITAMIN D 25 HYDROXY: CPT

## 2019-12-20 PROCEDURE — 84443 ASSAY THYROID STIM HORMONE: CPT

## 2019-12-20 PROCEDURE — 80053 COMPREHEN METABOLIC PANEL: CPT

## 2019-12-21 LAB
25(OH)D3 SERPL-MCNC: 29 NG/ML (ref 30–100)
TSH SERPL DL<=0.005 MIU/L-ACNC: 0.94 UIU/ML (ref 0.38–5.33)

## 2020-01-13 ENCOUNTER — SLEEP CENTER VISIT (OUTPATIENT)
Dept: SLEEP MEDICINE | Facility: MEDICAL CENTER | Age: 55
End: 2020-01-13
Payer: COMMERCIAL

## 2020-01-13 VITALS
SYSTOLIC BLOOD PRESSURE: 130 MMHG | HEIGHT: 63 IN | HEART RATE: 63 BPM | DIASTOLIC BLOOD PRESSURE: 70 MMHG | OXYGEN SATURATION: 94 % | WEIGHT: 247 LBS | BODY MASS INDEX: 43.77 KG/M2 | RESPIRATION RATE: 16 BRPM

## 2020-01-13 DIAGNOSIS — G47.33 OSA (OBSTRUCTIVE SLEEP APNEA): ICD-10-CM

## 2020-01-13 DIAGNOSIS — J45.909 MILD ASTHMA WITHOUT COMPLICATION, UNSPECIFIED WHETHER PERSISTENT: ICD-10-CM

## 2020-01-13 DIAGNOSIS — Z78.9 NONSMOKER: ICD-10-CM

## 2020-01-13 PROCEDURE — 99214 OFFICE O/P EST MOD 30 MIN: CPT | Performed by: NURSE PRACTITIONER

## 2020-01-13 NOTE — PROGRESS NOTES
Chief Complaint   Patient presents with   • Results     SS        HPI:  Isabela Alejandro is a 54 y.o. year old female here today for follow-up on HST results.  ALSO PULMONARY PATIENT; SEE DICTATION 9/20/19.    Sleep symptoms consist of unrefreshing sleep, excessive daytime sleepiness, morning headaches with EPS 4/24.  She has a history of asthma.    Home sleep study 12/7/2019 indicated an overall JEANNETTE of 42.2/h with a bobbi of 57%.  Patient spent 215 minutes less than 89% ox saturation.  Average heart rate 66 bpm.  Apneas were quite significant while supine.  Some snoring noted.  I reviewed findings with patient.  Recommendation is auto CPAP versus in lab titration study.  She notes cost to be an issue.  She has not met her deductible which is quite large.  She would like to pursue either treatment.  She denies cardiac or respiratory symptoms today.  BMI 43.      ROS: As per HPI and otherwise negative if not stated.    Past Medical History:   Diagnosis Date   • Asthma    • Chickenpox        History reviewed. No pertinent surgical history.    Family History   Problem Relation Age of Onset   • Stroke Neg Hx    • Heart Disease Neg Hx    • Sleep Apnea Neg Hx        Social History     Socioeconomic History   • Marital status:      Spouse name: Not on file   • Number of children: Not on file   • Years of education: Not on file   • Highest education level: Not on file   Occupational History   • Not on file   Social Needs   • Financial resource strain: Not on file   • Food insecurity:     Worry: Not on file     Inability: Not on file   • Transportation needs:     Medical: Not on file     Non-medical: Not on file   Tobacco Use   • Smoking status: Never Smoker   • Smokeless tobacco: Never Used   Substance and Sexual Activity   • Alcohol use: Yes     Comment: Occasionally   • Drug use: Not Currently   • Sexual activity: Not on file   Lifestyle   • Physical activity:     Days per week: Not on file     Minutes per session: Not  "on file   • Stress: Not on file   Relationships   • Social connections:     Talks on phone: Not on file     Gets together: Not on file     Attends Buddhist service: Not on file     Active member of club or organization: Not on file     Attends meetings of clubs or organizations: Not on file     Relationship status: Not on file   • Intimate partner violence:     Fear of current or ex partner: Not on file     Emotionally abused: Not on file     Physically abused: Not on file     Forced sexual activity: Not on file   Other Topics Concern   • Not on file   Social History Narrative   • Not on file       Allergies as of 01/13/2020 - Reviewed 01/13/2020   Allergen Reaction Noted   • Erythromycin Hives 05/11/2016        Vitals:  /70 (BP Location: Left arm, Patient Position: Sitting, BP Cuff Size: Adult)   Pulse 63   Resp 16   Ht 1.6 m (5' 3\")   Wt 112 kg (247 lb)   SpO2 94%     Current medications as of today   No current outpatient medications on file.     No current facility-administered medications for this visit.          Physical Exam:   Gen:           Alert and oriented, No apparent distress. Mood and affect appropriate, normal interaction with examiner.  Eyes:          PERRL, EOM intact, sclere white, conjunctive moist.  Ears:          Not examined.   Hearing:     Grossly intact.  Nose:          Normal, no lesions or deformities.  Dentition:    Good dentition.  Oropharynx:   Tongue normal, posterior pharynx without erythema or exudate.  Mallampati Classification: not examined.  Neck:        Supple, trachea midline, no masses.  Respiratory Effort: No intercostal retractions or use of accessory muscles.   Lung Auscultation:      Clear to auscultation bilaterally; no rales, rhonchi or wheezing.  CV:            Regular rate and rhythm. No murmurs, rubs or gallops.  Abd:           Not examined.   Lymphadenopathy: Not examined.  Gait and Station: Normal.  Digits and Nails: No clubbing, cyanosis, petechiae, or " nodes.   Cranial Nerves: II-XII grossly intact.  Skin:        No rashes, lesions or ulcers noted.               Ext:           No cyanosis or edema.      Assessment:  1. BOSTON (obstructive sleep apnea)     2. BMI 40.0-44.9, adult (HCC)  Height And Weight   3. Mild asthma without complication, unspecified whether persistent     4. Nonsmoker         Immunizations:    Flu:9/2019  Pneumovax 23:9/2019  Prevnar 13:due age 66    Plan:  1.  CPAP/BiPAP titration study versus auto CPAP.  Patient would like to check cost of therapies in the make a determination and treatment.  If cost is an issue, she may contact us to see if we have any leads on a refurbished device which may cost less but she can pay out of pocket for.  She understands the benefit of treatment.  2.  Discussed sleep hygiene.  3.  Encouraged weight loss.  4.  Follow-up in 3 months to reevaluate treatment plan, sooner if needed.  Patient will reach me via [employer].    Please note that this dictation was created using voice recognition software. I have made every reasonable attempt to correct obvious errors, but it is possible there are errors of grammar and possibly content that I did not discover before finalizing the note.

## 2020-02-24 ENCOUNTER — SLEEP STUDY (OUTPATIENT)
Dept: SLEEP MEDICINE | Facility: MEDICAL CENTER | Age: 55
End: 2020-02-24
Attending: NURSE PRACTITIONER
Payer: COMMERCIAL

## 2020-02-24 DIAGNOSIS — G47.33 OSA (OBSTRUCTIVE SLEEP APNEA): ICD-10-CM

## 2020-02-24 PROCEDURE — 95811 POLYSOM 6/>YRS CPAP 4/> PARM: CPT | Performed by: INTERNAL MEDICINE

## 2020-02-25 ENCOUNTER — PATIENT MESSAGE (OUTPATIENT)
Dept: SLEEP MEDICINE | Facility: MEDICAL CENTER | Age: 55
End: 2020-02-25

## 2020-02-25 NOTE — PROCEDURES
Comments:  The patient underwent a diagnostic polysomnogram using the standard montage for measurement of parameters of sleep, respiratory events, movement abnormalities, and heart rate and rhythm.   A microphone was used to monitor snoring.  Interpretation:  Study start time was 08:53:41 PM. Diagnostic recording time was 8h 43.5m with a total sleep time of 6h 19.0m resulting in a sleep efficiency of 72.40%%.   Sleep latency from the start of the study was 22 minutes and the latency from sleep to REM was 77 minutes.  In total,66 arousals were scored for an arousal index of 10.4.  Respiratory:  There were a total of 1 apneas consisting of 0 obstructive apneas, 0 mixed apneas, and 1 central apneas. A total of 51 hypopneas were scored.  The apnea index was 0.16 per hour and the hypopnea index was 8.07 per hour resulting in an overall AHI of 8.23.  AHI during rem was 9.4 and AHI while supine was 12.84.  Oximetry:  There was a mean oxygen saturation of 94.0% with a minimum oxygen saturation of 83.0%. Time spent with oxygen saturations below 89% was 2.7 minutes.  Cardiac:  The highest heart rate seen while awake was 88 BPM while the highest heart rate during sleep was 72 BPM with an average sleeping heart rate of 64 BPM.  Limb Movements:  There were a total of 256 PLMs during sleep, of which 15 were PLMS arousals. This resulted in a PLMS index of 40.5 and a PLMS arousal index of 2.4.  CPAP was tried from 5 cm H2O to 11 cm H2O.RECORDING TECHNIQUE:       After the scalp was prepared, gold plated electrodes were applied to the scalp according to the International 10-20 System. EEG (electroencephalogram) was continuously monitored from the O1-M2, O2-M1, C3-M2, C4-M1, F3-M2, and F4-M1.   EOGs (electrooculograms) were monitored by electrodes placed at the left and right outer canthi.  Chin EMG (electromyogram) was monitored by electrodes placed on the mentalis and sub-mentalis muscles.  Nasal and oral airflow were monitored  using a triple port thermocouple as well as oronasal pressure transducer.  Respiratory effort was measured by inductive plethysmography technology employing abdominal and thoracic belts.  Blood oxygen saturation and pulse were monitored by pulse oximetry.  Heart rhythm was monitored by surface electrocardiogram.  Leg EMG was studied using surface electrodes placed on left and right anterior tibialis.  A microphone was used to monitor tracheal sounds and snoring.  Body position was monitored and documented by technician observation      SUMMARY:    This was an overnight positive airway pressure titration polysomnogram.  The patient chose to use a small DreamWear fullface mask and heated humidification.     The total recording time was 523 minutes, the sleep period time was 499 minutes, and the total sleep time was 379 minutes.  The patient's sleep efficiency was 72.4 % which is reduced.  The patient experienced a normal 3 REM period(s).    The sleep stage durations revealed 120.5 minutes of wake after sleep onset (WASO), 9.0 minutes of N1 sleep, 287.0 minutes of N2 sleep, 0 minutes of N3 sleep, and 83 minutes of REM.    The latency to sleep was 22 minutes which is normal.  The latency to REM was 1 hour 17 minutes which is normal.  Mild sleep fragmentation occurred.  The arousal index was 10.4.  The Total Limb Movement (isolated) Index was 7.0, the Limb Movement with Arousal Index was 4.1, and the PLM Series Index was 39.6.    The patient experienced 1 central and 0 obstructive apneas, 2 central and 49 obstructive hypopneas, 52 apneas and hypopneas, and 0 RERAS.  The apnea hypopnea index was 8.2, the RDI was 8.2, the mean event duration was 29.8 seconds, and the longest event lasted 2.0 seconds.  The REM index was 2.1 and the supine index was 12.8.  The apnea hypopnea index represents mild sleep apnea hypopnea syndrome during the PAP titration.    The bobbi saturation during sleep was 83 % and the patient spent 1.9 %  of the recording with saturations less than or equal to 90%.    During sleep, the minimum heart rate was 56 bpm, the mean heart rate was 64 bpm, and the maximum heart rate was 72 bpm.    The technician initiated treatment with CPAP 5 cmH2O and increased the pressure to a maximum of 11 cmH2O.    The patient did best on the final trial using CPAP at 10 cm water with a resultant AHI of 4.8, a minimum saturation of 89 %, and a mean saturation of 94 %.  On this final setting the patient achieved REM but not supine sleep.      ASSESSMENT:    Satisfactory CPAP titration to a best pressure of 9 cm water with a resultant AHI of 4.8, a bobbi saturation of 89%, mean saturation of 94%, and the achievement of REM but not supine sleep      RECOMMENDATION:    Recommend a trial of CPAP at 10 cmH2O using a small DreamWear fullface mask and heated humidification followed by data card and clinical review in 6-8 weeks.

## 2020-03-17 NOTE — PATIENT COMMUNICATION
Finally got a hold of pt and she informed she would just like me to mail the order for CPAP to her for her to try and pursue through [email or web address]. Pt also advised that if she would like us to send orders to [facility] she will call us and let us know but as for right now she will try and obtain machine on her own.

## 2020-03-26 ENCOUNTER — OFFICE VISIT (OUTPATIENT)
Dept: PULMONOLOGY | Facility: HOSPICE | Age: 55
End: 2020-03-26
Payer: COMMERCIAL

## 2020-03-26 VITALS
SYSTOLIC BLOOD PRESSURE: 116 MMHG | OXYGEN SATURATION: 96 % | BODY MASS INDEX: 42.72 KG/M2 | HEIGHT: 64 IN | HEART RATE: 72 BPM | DIASTOLIC BLOOD PRESSURE: 80 MMHG | WEIGHT: 250.25 LBS

## 2020-03-26 DIAGNOSIS — J45.909 MILD ASTHMA WITHOUT COMPLICATION, UNSPECIFIED WHETHER PERSISTENT: ICD-10-CM

## 2020-03-26 DIAGNOSIS — G47.33 OSA (OBSTRUCTIVE SLEEP APNEA): ICD-10-CM

## 2020-03-26 PROCEDURE — 99214 OFFICE O/P EST MOD 30 MIN: CPT | Performed by: INTERNAL MEDICINE

## 2020-03-26 RX ORDER — ALBUTEROL SULFATE 2.5 MG/3ML
2.5 SOLUTION RESPIRATORY (INHALATION) EVERY 4 HOURS PRN
COMMUNITY

## 2020-03-26 ASSESSMENT — ENCOUNTER SYMPTOMS
MYALGIAS: 0
ABDOMINAL PAIN: 0
NAUSEA: 0
SORE THROAT: 0
COUGH: 0
FEVER: 0
WEIGHT LOSS: 0
STRIDOR: 0
DIARRHEA: 0
LOSS OF CONSCIOUSNESS: 0
PSYCHIATRIC NEGATIVE: 1
DIZZINESS: 0
SHORTNESS OF BREATH: 0
SENSORY CHANGE: 0
WHEEZING: 1
FOCAL WEAKNESS: 0
ORTHOPNEA: 0
VOMITING: 0
SPUTUM PRODUCTION: 0
EYE PAIN: 0
HEADACHES: 0
SINUS PAIN: 0
CHILLS: 0
EYE DISCHARGE: 0

## 2020-03-26 ASSESSMENT — FIBROSIS 4 INDEX: FIB4 SCORE: 0.84

## 2020-03-26 NOTE — PROGRESS NOTES
Pulmonary Clinic Note    Chief Complaint:  Chief Complaint   Patient presents with   • Asthma     Last seen 09/20/19     HPI:   Isabela Alejandro is a very pleasant 54 y.o. female never tobacco smoker who is followed in our clinic for mild intermittent asthma, obstructive sleep apnea and obesity last seen by Luiza MCKEON on 9/2019.    Previously had been managed with Breo, not taking consistently.  Previous exacerbations of been due to environmental allergens, particularly seasonal pollen and cats.  She reports intermittent chest tightness which is relieved with albuterol nebulizer.  Recent ED visit in 3/2019, CT angiogram personally reviewed showed mild mosaicism, otherwise no abnormalities.  PFTs 6/2019 normal forced volumes, normal ratio, no bronchodilator response, reduced expiratory reserve volume, supranormal RV and TLC, RV/TLC ratio 119%, and supranormal diffusion capacities. Last ED visit for asthma exacerbation in 3/2019.    Followed closely by Dr. Guzman in the sleep clinic.  Recent CPAP titration study recommended CPAP 10 cmH2O, pending machine delivery.    +7 pounds since last visit.  Resumed a whole food plant-based diet yesterday.    MMRC Grade: 1: Short of breath when hurrying or going up a small hill  NYHA Class: I-II    Past Medical History:   Diagnosis Date   • Asthma    • Chickenpox        History reviewed. No pertinent surgical history.    Social History     Socioeconomic History   • Marital status:      Spouse name: Not on file   • Number of children: Not on file   • Years of education: Not on file   • Highest education level: Not on file   Occupational History   • Not on file   Social Needs   • Financial resource strain: Not on file   • Food insecurity     Worry: Not on file     Inability: Not on file   • Transportation needs     Medical: Not on file     Non-medical: Not on file   Tobacco Use   • Smoking status: Never Smoker   • Smokeless tobacco: Never Used   Substance and Sexual Activity    • Alcohol use: Yes     Comment: Occasionally   • Drug use: Not Currently   • Sexual activity: Not on file   Lifestyle   • Physical activity     Days per week: Not on file     Minutes per session: Not on file   • Stress: Not on file   Relationships   • Social connections     Talks on phone: Not on file     Gets together: Not on file     Attends Jew service: Not on file     Active member of club or organization: Not on file     Attends meetings of clubs or organizations: Not on file     Relationship status: Not on file   • Intimate partner violence     Fear of current or ex partner: Not on file     Emotionally abused: Not on file     Physically abused: Not on file     Forced sexual activity: Not on file   Other Topics Concern   • Not on file   Social History Narrative   • Not on file          Family History   Problem Relation Age of Onset   • Stroke Neg Hx    • Heart Disease Neg Hx    • Sleep Apnea Neg Hx        Current Outpatient Medications on File Prior to Visit   Medication Sig Dispense Refill   • albuterol (PROVENTIL) 2.5mg/3ml Nebu Soln solution for nebulization 2.5 mg by Nebulization route every four hours as needed for Shortness of Breath.       No current facility-administered medications on file prior to visit.        Allergies: Erythromycin      ROS:   Review of Systems   Constitutional: Negative for chills, fever and weight loss.   HENT: Negative for congestion, sinus pain and sore throat.    Eyes: Negative for pain and discharge.   Respiratory: Positive for wheezing (intermittent). Negative for cough, sputum production, shortness of breath and stridor.    Cardiovascular: Negative for chest pain, orthopnea and leg swelling.   Gastrointestinal: Negative for abdominal pain, diarrhea, nausea and vomiting.   Genitourinary: Negative for dysuria, frequency and urgency.   Musculoskeletal: Negative for myalgias.   Skin: Negative for rash.   Neurological: Negative for dizziness, sensory change, focal  "weakness, loss of consciousness and headaches.   Psychiatric/Behavioral: Negative.    All other systems reviewed and are negative.    Vitals:  /80 (BP Location: Left arm, Patient Position: Sitting, BP Cuff Size: Large adult)   Pulse 72   Ht 1.626 m (5' 4\")   Wt 113.5 kg (250 lb 4 oz)   SpO2 96%     Physical Exam:  Physical Exam  Vitals signs and nursing note reviewed.   Constitutional:       General: She is not in acute distress.     Appearance: Normal appearance. She is well-developed. She is obese. She is not diaphoretic.      Comments: Very pleasant   Eyes:      General: No scleral icterus.        Right eye: No discharge.         Left eye: No discharge.      Conjunctiva/sclera: Conjunctivae normal.      Pupils: Pupils are equal, round, and reactive to light.   Neck:      Thyroid: No thyromegaly.      Vascular: No JVD.   Cardiovascular:      Rate and Rhythm: Normal rate and regular rhythm.      Heart sounds: Normal heart sounds. No murmur. No gallop.    Pulmonary:      Effort: Pulmonary effort is normal. No respiratory distress.      Breath sounds: Normal breath sounds. No wheezing or rales.   Abdominal:      General: There is no distension.      Palpations: Abdomen is soft.      Tenderness: There is no abdominal tenderness. There is no guarding.   Musculoskeletal:         General: No tenderness.   Lymphadenopathy:      Cervical: No cervical adenopathy.   Skin:     General: Skin is warm.      Capillary Refill: Capillary refill takes less than 2 seconds.      Findings: No erythema or rash.   Neurological:      General: No focal deficit present.      Mental Status: She is alert and oriented to person, place, and time. Mental status is at baseline.      Cranial Nerves: No cranial nerve deficit.      Sensory: No sensory deficit.      Motor: No abnormal muscle tone.   Psychiatric:         Mood and Affect: Mood normal.         Behavior: Behavior normal.       Laboratory Data:    PFTs as reviewed by me " personally show:  PFTs 6/2019 normal forced volumes, normal ratio, no bronchodilator response, reduced expiratory reserve volume, supranormal RV and TLC, RV/TLC ratio 119%, and supranormal diffusion capacities.     Imaging as reviewed by me personally show:    3/2019, CT angiogram personally reviewed showed mild mosaicism, otherwise no abnormalities.     Assessment/Plan:    Problem List Items Addressed This Visit     Uncomplicated asthma     -Intermittent asthma since 7 years of age  -Manifest as chest tightness, particularly in the spring and with weather changes and cats.  She currently uses her albuterol nebulizer as needed.  She is not consistent about using Breo.  - Very reluctant to use any medications whatsoever, counseled about the pathophysiology of chronic inflammation associated with asthma, she was agreeable to resume Breo 100/25 1 puff daily, rinse mouth after use  -With weight loss and continued sinus care may be able to transition to Breo as needed         Relevant Medications    albuterol (PROVENTIL) 2.5mg/3ml Nebu Soln solution for nebulization    Fluticasone Furoate-Vilanterol (BREO ELLIPTA) 100-25 MCG/INH AEROSOL POWDER, BREATH ACTIVATED    Albuterol Sulfate 108 (90 Base) MCG/ACT AEROSOL POWDER, BREATH ACTIVATED    BOSTON (obstructive sleep apnea)     -Important comorbidity for control of asthma, followed closely in the sleep clinic  -Recent CPAP titration study recommended CPAP 10 cm H2O, awaiting delivery of CPAP, then will start  -Reiterated importance of sinus hygiene with saline nasal rinse twice daily followed by Flonase         BMI 40.0-44.9, adult (HCC)     -+9 pounds since last visit  - Important comorbidity for control of asthma, she is transitioning to a whole food and plant-based diet, counseled about importance of weight loss and exercise for asthma control.             Return in about 6 months (around 9/26/2020).     This note was generated using voice recognition software which has a  chance of producing errors of grammar and possibly content.  I have made every reasonable attempt to find and correct any obvious errors, but it should be expected that some may not be found prior to finalization of this note.  __________  Boone Castellon MD  Pulmonary and Critical Care Medicine  [facility]

## 2020-03-26 NOTE — ASSESSMENT & PLAN NOTE
-Important comorbidity for control of asthma, followed closely in the sleep clinic  -Recent CPAP titration study recommended CPAP 10 cm H2O, awaiting delivery of CPAP, then will start  -Reiterated importance of sinus hygiene with saline nasal rinse twice daily followed by Flonase

## 2020-03-26 NOTE — ASSESSMENT & PLAN NOTE
-Intermittent asthma since 7 years of age  -Manifest as chest tightness, particularly in the spring and with weather changes and cats.  She currently uses her albuterol nebulizer as needed.  She is not consistent about using Breo.  - Very reluctant to use any medications whatsoever, counseled about the pathophysiology of chronic inflammation associated with asthma, she was agreeable to resume Breo 100/25 1 puff daily, rinse mouth after use  -With weight loss and continued sinus care may be able to transition to Breo as needed

## 2020-03-26 NOTE — ASSESSMENT & PLAN NOTE
-+9 pounds since last visit  - Important comorbidity for control of asthma, she is transitioning to a whole food and plant-based diet, counseled about importance of weight loss and exercise for asthma control.

## 2020-04-28 ENCOUNTER — APPOINTMENT (OUTPATIENT)
Dept: SLEEP MEDICINE | Facility: MEDICAL CENTER | Age: 55
End: 2020-04-28
Payer: COMMERCIAL

## 2020-08-11 ENCOUNTER — APPOINTMENT (OUTPATIENT)
Dept: SLEEP MEDICINE | Facility: MEDICAL CENTER | Age: 55
End: 2020-08-11

## 2020-10-04 ASSESSMENT — ENCOUNTER SYMPTOMS
SHORTNESS OF BREATH: 0
LOSS OF CONSCIOUSNESS: 0
EYE DISCHARGE: 0
SENSORY CHANGE: 0
FEVER: 0
SORE THROAT: 0
ORTHOPNEA: 0
MYALGIAS: 0
CHILLS: 0
SPUTUM PRODUCTION: 0
DIARRHEA: 0
PSYCHIATRIC NEGATIVE: 1
FOCAL WEAKNESS: 0
EYE PAIN: 0
COUGH: 0
VOMITING: 0
STRIDOR: 0
NAUSEA: 0
ABDOMINAL PAIN: 0
DIZZINESS: 0
SINUS PAIN: 0
WEIGHT LOSS: 0
HEADACHES: 0
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
## 2020-10-05 ENCOUNTER — OFFICE VISIT (OUTPATIENT)
Dept: PULMONOLOGY | Facility: HOSPICE | Age: 55
End: 2020-10-05
Payer: COMMERCIAL

## 2020-10-05 VITALS
OXYGEN SATURATION: 94 % | HEIGHT: 64 IN | DIASTOLIC BLOOD PRESSURE: 84 MMHG | BODY MASS INDEX: 41.19 KG/M2 | WEIGHT: 241.25 LBS | HEART RATE: 67 BPM | SYSTOLIC BLOOD PRESSURE: 110 MMHG

## 2020-10-05 DIAGNOSIS — G47.33 OSA (OBSTRUCTIVE SLEEP APNEA): ICD-10-CM

## 2020-10-05 DIAGNOSIS — J45.909 MILD ASTHMA WITHOUT COMPLICATION, UNSPECIFIED WHETHER PERSISTENT: ICD-10-CM

## 2020-10-05 PROCEDURE — 99214 OFFICE O/P EST MOD 30 MIN: CPT | Performed by: INTERNAL MEDICINE

## 2020-10-05 ASSESSMENT — FIBROSIS 4 INDEX: FIB4 SCORE: 0.86

## 2020-10-05 ASSESSMENT — ENCOUNTER SYMPTOMS: WHEEZING: 0

## 2020-10-05 NOTE — ASSESSMENT & PLAN NOTE
-Intermittent asthma since 7 years of age  -Manifest as chest tightness, particularly in the spring and with weather changes and cats.  She currently uses her albuterol nebulizer as needed.    -Symptoms much improved with weight loss, not using rescue inhaler at all  -Did not fill Rx for Breo given improvement in symptoms  -Discussed continued sinus hygiene, allergen avoidance, etc

## 2020-10-05 NOTE — ASSESSMENT & PLAN NOTE
-9 lbs since last visit!  Doing very well with plant-based diet and intermittent fasting  Weight loss has positively contributed to improvement in her asthma symptoms, commended her for these positive lifestyle changes

## 2020-10-07 ENCOUNTER — TELEMEDICINE (OUTPATIENT)
Dept: SLEEP MEDICINE | Facility: MEDICAL CENTER | Age: 55
End: 2020-10-07
Payer: COMMERCIAL

## 2020-10-07 VITALS — HEIGHT: 64 IN | WEIGHT: 241 LBS | BODY MASS INDEX: 41.15 KG/M2

## 2020-10-07 DIAGNOSIS — J45.909 MILD ASTHMA WITHOUT COMPLICATION, UNSPECIFIED WHETHER PERSISTENT: ICD-10-CM

## 2020-10-07 DIAGNOSIS — G47.33 OSA (OBSTRUCTIVE SLEEP APNEA): ICD-10-CM

## 2020-10-07 PROCEDURE — 99213 OFFICE O/P EST LOW 20 MIN: CPT | Mod: 95,CR | Performed by: NURSE PRACTITIONER

## 2020-10-07 ASSESSMENT — FIBROSIS 4 INDEX: FIB4 SCORE: 0.86

## 2020-10-07 NOTE — PROGRESS NOTES
"Virtual Visit: Established Patient   This visit was conducted via Zoom using secure and encrypted videoconferencing technology. The patient was in a private location in the state of [place].    The patient's identity was confirmed and verbal consent was obtained for this virtual visit.  Given the importance of social distancing and other strategies recommended to reduce the risk of COVID-19 transmission, I am providing medical care to this patient via audio/video visit in place of an in person visit at the request of the patient.  Subjective:   CC:   Chief Complaint   Patient presents with   • Follow-Up     BOSTON-Last seen 01/13/2020   • Results     Sleep Study 02/24/2020       Isabela Alejandro is a 55 y.o. female presenting for evaluation and management of BOSTON. PMH includes asthma, obesity, BOSTON.     Patient is also followed by the pulmonary group.  Please refer to Dr. Castellon's note from 10/5/2020 for further details.    Home sleep study from 12/7/2019 indicated an overall JEANNETTE of 42.2/h with a bobbi of 57%.  Patient spent 215 minutes less than 89% ox saturation.  Average heart rate 66 bpm.  Apneas were quite significant while supine.  Some snoring noted.     PSG titration from 2/24/20 indicated satisfactory CPAP titration to a best pressure of 9 cm water with a resultant AHI of 4.8, a bobbi saturation of 89%, mean saturation of 94%, and the achievement of REM but not supine sleep.     Patient obtained her CPAP and supplies out-of-pocket as it was not financially feasible to go through insurance.  First compliance report from 9/7/20-10/6/20 was downloaded and reviewed with the patient which showed autoCPAP 5-15 cmH2O, 97% compliance, 8 hrs 54 min, AHI of 0.9. She is tolerating the pressure and mask well. She goes to bed between 9-9:30 pm and wakes up between 5:30-6 am.  Most nights she sleeps really well however she is now going through menopause and that \"hot flashes\" do at times affect her sleep.  She denies morning " "headaches or snoring.  She feels that she is more rested, has more energy and is more clear headed since she has been on CPAP therapy.  Her headaches and snoring also resolved.  She recently followed up with the pulmonary clinic for asthma which has been stable and well controlled and can follow-up on a yearly basis.  She has albuterol that she may use as needed in the last time she used it was about 8 months ago.  She stays active by using her mini trampoline as well as a recumbent bike 30 minutes couple days a week.  She reports a 12 pound weight loss recently.  She denies any new health problems or medications.      ROS   Denies any recent fevers or chills. No nausea or vomiting. No chest pains or shortness of breath.     Allergies   Allergen Reactions   • Erythromycin Hives     Hives       Current medicines (including changes today)  Current Outpatient Medications   Medication Sig Dispense Refill   • albuterol (PROVENTIL) 2.5mg/3ml Nebu Soln solution for nebulization 2.5 mg by Nebulization route every four hours as needed for Shortness of Breath.     • Albuterol Sulfate 108 (90 Base) MCG/ACT AEROSOL POWDER, BREATH ACTIVATED Inhale 2 Puffs by mouth 4 times a day as needed. 1 Each 11     No current facility-administered medications for this visit.        Patient Active Problem List    Diagnosis Date Noted   • BMI 40.0-44.9, adult ([facility]) 04/30/2019   • Uncomplicated asthma 04/29/2019   • BOSTON (obstructive sleep apnea) 04/29/2019       Family History   Problem Relation Age of Onset   • Stroke Neg Hx    • Heart Disease Neg Hx    • Sleep Apnea Neg Hx        She  has a past medical history of Asthma and Chickenpox. She also has no past medical history of Cancer ([facility]).  She  has no past surgical history on file.       Objective:   Ht 1.626 m (5' 4\")   Wt 109.3 kg (241 lb)   BMI 41.37 kg/m²     Physical Exam:  Constitutional: Alert, no distress, well-groomed.  Skin: No rashes in visible areas.  Eye: Round. Conjunctiva " clear, lids normal. No icterus.   ENMT: Lips pink without lesions, good dentition, moist mucous membranes. Phonation normal.  Neck: Moves freely without pain.  Respiratory: Unlabored respiratory effort, no cough or audible wheeze  Psych: Alert and oriented x3, normal affect and mood.       Assessment and Plan:   The following treatment plan was discussed:     1. BOSTON (obstructive sleep apnea)  - DME Mask and Supplies    2. Mild asthma without complication, unspecified whether persistent    3. BMI 40.0-44.9, adult (formerly [facility])      Discussed the cardiovascular and neuropsychiatric risks of untreated BOSTON; including but not limited to: HTN, DM, MI, ASCVD, CVA, CHF, traffic accidents.     1. First compliance report from 9/7/20-10/6/20 was downloaded and reviewed with the patient which showed autoCPAP 5-15 cmH2O, 97% compliance, 8 hrs 54 min, AHI of 0.9. Continue autoCPAP. Patient is compliant and benefiting from autoCPAP therapy for management of BOSTON.   2. DME order (Online) for mask (small DreamWear nasal mask or MOC) and supplies was provided today. Continue to clean mask and supplies weekly, and change supplies per insurance guidelines.   3. Continue to stay active by riding her mini trampoline and recumbent bike for 30 minutes couple times a week to help with weight loss and management.  Patient recently lost 12 pounds.   4. Follow up with the appropriate healthcare practitioners for all other medical problems and issues.  5. Sleep hygiene discussed.  6.  Continue to follow-up with the pulmonary clinic.  Asthma is stable and well controlled.    Follow-up: Return in about 1 year (around 10/7/2021). or sooner if needed.     FELICIA Trevino.    This dictation was created using voice recognition software. The accuracy of the dictation is limited to the abilities of the software. I expect there may be some errors of grammar and possibly content.

## 2022-04-12 ENCOUNTER — OCCUPATIONAL MEDICINE (OUTPATIENT)
Dept: URGENT CARE | Facility: PHYSICIAN GROUP | Age: 57
End: 2022-04-12
Payer: COMMERCIAL

## 2022-04-12 VITALS
RESPIRATION RATE: 14 BRPM | HEART RATE: 70 BPM | BODY MASS INDEX: 40.97 KG/M2 | TEMPERATURE: 98.8 F | OXYGEN SATURATION: 98 % | DIASTOLIC BLOOD PRESSURE: 80 MMHG | WEIGHT: 240 LBS | HEIGHT: 64 IN | SYSTOLIC BLOOD PRESSURE: 130 MMHG

## 2022-04-12 DIAGNOSIS — Z02.1 PRE-EMPLOYMENT DRUG SCREENING: Primary | ICD-10-CM

## 2022-04-12 DIAGNOSIS — Z87.828 HISTORY OF TEAR OF MENISCUS OF KNEE JOINT: ICD-10-CM

## 2022-04-12 DIAGNOSIS — S89.91XA INJURY OF RIGHT KNEE, INITIAL ENCOUNTER: ICD-10-CM

## 2022-04-12 PROCEDURE — 82075 ASSAY OF BREATH ETHANOL: CPT | Performed by: FAMILY MEDICINE

## 2022-04-12 PROCEDURE — 80305 DRUG TEST PRSMV DIR OPT OBS: CPT | Performed by: FAMILY MEDICINE

## 2022-04-12 PROCEDURE — 99203 OFFICE O/P NEW LOW 30 MIN: CPT | Performed by: FAMILY MEDICINE

## 2022-04-12 ASSESSMENT — ENCOUNTER SYMPTOMS
WEIGHT LOSS: 0
MYALGIAS: 0
NAUSEA: 0
VOMITING: 0

## 2022-04-12 NOTE — LETTER
[facility]  [address] [place] [address]. [address] - [place] [address]  Phone:  [phone] - Fax:  [phone]   [facility] Progress Report and Disability Certification  Date of Service: 4/12/2022   No Show:  No  Date / Time of Next Visit: 4/19/2022 occupational medicine   Claim Information   Patient Name: Isabela Alejandro  Claim Number:     Employer:  [employer] Date of Injury:      Insurer / TPA:  [employer] ID / SSN:     Occupation: [employer] Diagnosis: Diagnoses of Injury of right knee, initial encounter and History of tear of meniscus of knee joint were pertinent to this visit.    Medical Information   Related to Industrial Injury? Yes    Subjective Complaints:  DOI: 4/12/22  Right hip and knee injury. MILTON: fall on ice while at work, ice present due sprinklers running in freezing temps. Surface concrete. Right knee pain 4/10 at rest, 7/10 with ambulation. Limping. Known prior injury/medial meniscus tear/evaluated 3 weeks ago and doing well with determination to not pursue surgical intervention.. Knee feels swollen. Locking today that he has not been doing recently. Right lateral hip mild. No OTC medications or home remedy. She is currently taking meloxicam.    Objective Findings: Right hip: tender lateral aspect without deformity. Range of motion intact.   Right knee: probable effusion, tender medial joint line. Stable. Flexion limited to 110 degrees. Distal neuro/vascular intact.      Pre-Existing Condition(s): Right knee medial meniscus tear   Assessment:   Initial Visit    Status: Additional Care Required  Permanent Disability:No    Plan:   Comments:ice, nsaid, brace, transfer care to [facility] med    Diagnostics:      Comments:       Disability Information   Status: Released to Restricted Duty    From:  4/12/2022  Through: 4/19/2022 Restrictions are: Temporary   Physical Restrictions   Sitting:    Standing:  < or = to 2 hrs/day Stooping:     Bending:      Squattin hrs/day Walking:  < or = to 1 hr/day Climbing:    Pushing:      Pulling:    Other:    Reaching Above Shoulder (L):   Reaching Above Shoulder (R):       Reaching Below Shoulder (L):    Reaching Below Shoulder (R):      Not to exceed Weight Limits   Carrying(hrs):   Weight Limit(lb): < or = to 10 pounds Lifting(hrs):   Weight  Limit(lb): < or = to 10 pounds   Comments: Knee brace as needed    Repetitive Actions   Hands: i.e. Fine Manipulations from Grasping:     Feet: i.e. Operating Foot Controls:     Driving / Operate Machinery:     Health Care Provider’s Original or Electronic Signature  Vahe Scott M.D. Health Care Provider’s Original or Electronic Signature    Drew Harper MD         Clinic Name / Location: [address]  [place] [address] Clinic Phone Number: Dept: [phone]   Appointment Time: 11:20 Am Visit Start Time: 12:16 PM   Check-In Time:  11:20 Am Visit Discharge Time: 1:19 PM   Original-Treating Physician or Chiropractor    Page 2-Insurer/TPA    Page 3-Employer    Page 4-Employee

## 2022-04-12 NOTE — LETTER
"EMPLOYEE’S CLAIM FOR COMPENSATION/ REPORT OF INITIAL TREATMENT  FORM C-4    EMPLOYEE’S CLAIM - PROVIDE ALL INFORMATION REQUESTED   First Name  Isabela Last Name  Javon Birthdate                    1965                Sex  female Claim Number (Insurer’s Use Only)    Home Address  [address] Age  56 y.o. Height  1.626 m (5' 4\") Weight  109 kg (240 lb) [facility]     [facility]  [address] Telephone  [phone] (home)    Mailing Address  [address]  [address] Primary Language Spoken  English    Insurer   Third-Party      [employer] Employee's Occupation (Job Title) When Injury or Occupational Disease Occurred   N/A   Employer's Name/Company Name    [employer] Telephone   [phone]   Office Mail Address (Number and Street)    [address]   [address]   Date of Injury              04/12/22   Hours Injury  7:50AM Date Employer Notified  04/12/2022 Last Day of Work after Injury     or Occupational Disease  04/12/2022 Supervisor to Whom Injury     Reported  ALYSSA CONNOLLY   Address or Location of Accident (if applicable)  [address] [address]   What were you doing at the time of accident? (if applicable)   WALKING FROM CAR IN PARKING LOT TO BUILDING, SPRINKLERS HAD ICED THE SIDEWALK.   How did this injury or occupational disease occur? (Be specific an answer in detail. Use additional sheet if necessary)  SPRINKLERS HAD ICED SIDEWALK, WAS WALKING CAREFULLY, BUT SLIPPED ON FLARED SIDE OF HANDICAP SIDEWALK RAMP.    If you believe that you have an occupational disease, when did you first have knowledge of the disability and it relationship to your employment?  N/A Witnesses to the Accident     ARLENE SALINAS   Nature of Injury or Occupational Disease   STRAIN/SPRAIN Part(s) of Body Injured or Affected  RIGHT " KNEE, RIGHT HAND , RIGHT HIP     I certify that the above is true and correct to the best of my knowledge and that I have provided this information in order to obtain the benefits of [place]  I hereby authorize any physician, chiropractor, surgeon, practitioner, or other person, any hospital, including [facility] or [facility], any medical service organization, any insurance company, or other institution or organization to release to each other, any medical or other information, including benefits paid or payable, pertinent to this injury or disease, except information relative to diagnosis, treatment and/or counseling for AIDS, psychological conditions, alcohol or controlled substances, for which I must give specific authorization.  A Photostat of this authorization shall be as valid as the original.     Date   Place Employee’s Original or  *Electronic Signature   THIS REPORT MUST BE COMPLETED AND MAILED WITHIN 3 WORKING DAYS OF TREATMENT   Place  [facility]  Name of Facility  [place]   Date  4/12/2022 Diagnosis and Description of Injury or Occupational Disease  (S89.91XA) Injury of right knee, initial encounter  (Z87.828) History of tear of meniscus of knee joint Is there evidence the injured employee was under the influence of alcohol and/or another controlled substance at the time of accident?  ? No ? Yes (if yes, please explain)    Hour  12:16 PM   Diagnoses of Injury of right knee, initial encounter and History of tear of meniscus of knee joint were pertinent to this visit. No   Treatment  Ice, nsaid, rest.   Have you advised the patient to remain off work five days or     more?    X-Ray Findings    Comments:NA   ? Yes Indicate dates:   From   To      From information given by the employee, together with medical evidence, can        you directly connect this  "injury or occupational disease as job incurred?  Yes ? No If no, is the injured employee capable of:  ? full duty  No ? modified duty  Yes   Is additional medical care by a physician indicated?  Yes If Modified Duty, Specify any Limitations / Restrictions  No squatting  Walking <1hr in shift  Standing <2hr in shift  Lifting/carrying weight limit 10#   Do you know of any previous injury or disease contributing to this condition or occupational disease?  ? Yes ? No (Explain if yes)                          Yes  Comments:medial meniscus tear   Date  4/12/2022 Print Health Care Provider's   Vahe Scott M.D. I certify the employer’s copy of  this form was mailed on:   Address  [address] [place] [address]. [address] Insurer’s Use Only     [facility] Zip  [address]    Provider’s Tax ID Number  [id number] Telephone  Dept: [phone]             Health Care Provider’s Original or Electronic Signature  e-VAHE Lawton M.D. Degree (MD,DO, DC,PA-C,APRN)   MD      * Complete and attach Release of Information (Form C-4A) when injured employee signs C-4 Form electronically  ORIGINAL - TREATING HEALTHCARE PROVIDER PAGE 2 - INSURER/TPA PAGE 3 - EMPLOYER PAGE 4 - EMPLOYEE             Form C-4 (rev.08/21)           BRIEF DESCRIPTION OF RIGHTS AND BENEFITS  (Pursuant to [place] 616C.050)    Notice of Injury or Occupational Disease (Incident Report Form C-1): If an injury or occupational disease (OD) arises out of and in the course of employment, you must provide written notice to your employer as soon as practicable, but no later than 7 days after the accident or OD. Your employer shall maintain a sufficient supply of the required forms.    Claim for Compensation (Form C-4): If medical treatment is sought, the form C-4 is available at the place of initial treatment. A completed \"Claim for Compensation\" (Form C-4) must be filed within 90 days after an accident or OD. The treating physician or chiropractor must, within 3 working days " after treatment, complete and mail to the employer, the employer's insurer and third-party , the Claim for Compensation.    Medical Treatment: If you require medical treatment for your on-the-job injury or OD, you may be required to select a physician or chiropractor from a list provided by your workers’ compensation insurer, if it has contracted with an Organization for Managed Care (MCO) or Preferred Provider Organization (PPO) or providers of health care. If your employer has not entered into a contract with an MCO or PPO, you may select a physician or chiropractor from the Panel of Physicians and Chiropractors. Any medical costs related to your industrial injury or OD will be paid by your insurer.    Temporary Total Disability (TTD): If your doctor has certified that you are unable to work for a period of at least 5 consecutive days, or 5 cumulative days in a 20-day period, or places restrictions on you that your employer does not accommodate, you may be entitled to TTD compensation.    Temporary Partial Disability (TPD): If the wage you receive upon reemployment is less than the compensation for TTD to which you are entitled, the insurer may be required to pay you TPD compensation to make up the difference. TPD can only be paid for a maximum of 24 months.    Permanent Partial Disability (PPD): When your medical condition is stable and there is an indication of a PPD as a result of your injury or OD, within 30 days, your insurer must arrange for an evaluation by a rating physician or chiropractor to determine the degree of your PPD. The amount of your PPD award depends on the date of injury, the results of the PPD evaluation, your age and wage.    Permanent Total Disability (PTD): If you are medically certified by a treating physician or chiropractor as permanently and totally disabled and have been granted a PTD status by your insurer, you are entitled to receive monthly benefits not to exceed 66  2/3% of your average monthly wage. The amount of your PTD payments is subject to reduction if you previously received a lump-sum PPD award.    Vocational Rehabilitation Services: You may be eligible for vocational rehabilitation services if you are unable to return to the job due to a permanent physical impairment or permanent restrictions as a result of your injury or occupational disease.    Transportation and Per Catalino Reimbursement: You may be eligible for travel expenses and per catalino associated with medical treatment.    Reopening: You may be able to reopen your claim if your condition worsens after claim closure.     Appeal Process: If you disagree with a written determination issued by the insurer or the insurer does not respond to your request, you may appeal to the Department of Administration, , by following the instructions contained in your determination letter. You must appeal the determination within 70 days from the date of the determination letter at [address], [address], [place], [place] [address], or [address], [address], [place] [address]. If you disagree with the  decision, you may appeal to the Department of Administration, . You must file your appeal within 30 days from the date of the  decision letter at [address], [address], [place], [place] [address], or [address], [address], [place], [place] [address]. If you disagree with a decision of an , you may file a petition for judicial review with the District Court. You must do so within 30 days of the Appeal Officer’s decision. You may be represented by an  at your own expense or you may contact the [facility] for possible representation.    [place]  for Injured Workers (NAIW): If you disagree with a  decision, you may request that NAIW represent you without charge at an  Hearing. For  information regarding denial of benefits, you may contact the [facility] at: [address], [address], [place] [address], [phone], or [address], [place] [address], [phone]    To File a Complaint with the Division: If you wish to file a complaint with the  of the Division of Industrial Relations (DIR),  please contact the Workers’ Compensation Section, [address], [address], [place], [place] [address], telephone (436) [phone], or [address], [address], [place], [place] [address], telephone [phone]) [phone].    For assistance with Workers’ Compensation Issues: You may contact the [facility] Office for Consumer Health Assistance, [address], [address], [place], [place] [address], Toll Free 1-255.843.8703, Web site: http://[facility].[place].gov/Programs/LORRAINE E-mail: lorraine@[facility].nv.[facility]              __________________________________________________________________                                    _________________            Employee Name / Signature                                                                                                                            Date                                                                                                                                                                                                                              D-2 (rev. 10/20)

## 2022-04-12 NOTE — PROGRESS NOTES
"Subjective     Isabela Alejandro is a 56 y.o. female who presents with Work-Related Injury ([facility] [employer]/R knee injury/DOI 4/12/22. Pt slipped on ice and injured R knee. Swelling, tender, painful. )      DOI: 4/12/22  Right hip and knee injury. MILTON: fall on ice while at work, ice present due sprinklers running in freezing temps. Surface concrete. Right knee pain 4/10 at rest, 7/10 with ambulation. Limping. Known prior injury/medial meniscus tear/evaluated 3 weeks ago and doing well with determination to not pursue surgical intervention.. Knee feels swollen. Locking today that he has not been doing recently. Right lateral hip mild. No OTC medications or home remedy. She is currently taking meloxicam.      HPI    Review of Systems   Constitutional: Negative for malaise/fatigue and weight loss.   Gastrointestinal: Negative for nausea and vomiting.   Musculoskeletal: Negative for joint pain and myalgias.   Skin:        Mild abrasions right hand. No full-thickness lacerations.              Objective     /80 (BP Location: Right arm, Patient Position: Sitting, BP Cuff Size: Adult)   Pulse 70   Temp 37.1 °C (98.8 °F) (Temporal)   Resp 14   Ht 1.626 m (5' 4\")   Wt 109 kg (240 lb)   SpO2 98%   BMI 41.20 kg/m²      Physical Exam    Right hip: tender lateral aspect without deformity. Range of motion intact.   Right knee: probable effusion, tender medial joint line. Stable. Flexion limited to 110 degrees. Distal neuro/vascular intact.                      Assessment & Plan   [facility] main sports office visit 3/30/2022 reviewed     1. Injury of right knee, initial encounter  Referral to Occupational Medicine   2. History of tear of meniscus of knee joint  Referral to Occupational Medicine     Differential diagnosis, natural history, supportive care, and indications for immediate follow-up discussed at length.     Patient has her own brace that works well for her.  Ice and NSAID as needed.  Concern for " worsening of known medial meniscus tear.  Will refer to occupational medicine to follow-up.    Work restrictions on [address].

## 2022-04-13 LAB
BREATH ALCOHOL COMMENT: NORMAL
POC BREATHALIZER: 0 PERCENT (ref 0–0.01)

## 2022-04-22 ENCOUNTER — OCCUPATIONAL MEDICINE (OUTPATIENT)
Dept: OCCUPATIONAL MEDICINE | Facility: CLINIC | Age: 57
End: 2022-04-22
Payer: COMMERCIAL

## 2022-04-22 VITALS
BODY MASS INDEX: 42.17 KG/M2 | HEIGHT: 64 IN | WEIGHT: 247 LBS | TEMPERATURE: 97.3 F | OXYGEN SATURATION: 97 % | DIASTOLIC BLOOD PRESSURE: 78 MMHG | HEART RATE: 71 BPM | SYSTOLIC BLOOD PRESSURE: 118 MMHG

## 2022-04-22 DIAGNOSIS — S86.911D KNEE STRAIN, RIGHT, SUBSEQUENT ENCOUNTER: ICD-10-CM

## 2022-04-22 DIAGNOSIS — Z87.828 HX OF TEAR OF MENISCUS OF KNEE JOINT: ICD-10-CM

## 2022-04-22 DIAGNOSIS — S76.011D HIP STRAIN, RIGHT, SUBSEQUENT ENCOUNTER: ICD-10-CM

## 2022-04-22 PROCEDURE — 99203 OFFICE O/P NEW LOW 30 MIN: CPT | Performed by: PREVENTIVE MEDICINE

## 2022-04-22 NOTE — PROGRESS NOTES
"Subjective:     Isabela Alejandro is a 56 y.o. female who presents for Other (WC DOI 4/12/22 rt knee injury, feeling better room 17)      DOI: 4/12/22: 55 yo injured worker presents with Right hip and knee injury. MILTON: fall on ice while at work, ice present due sprinklers running in freezing temps. Surface concrete.  She was seen in urgent care x1, x-rays of the right knee were negative for acute findings.  She does have a history of knee pain and meniscal injury, she was evaluated orthopedics about a month ago and elected conservative management of the symptoms.  Patient states that overall the most painful aspect is the right hip.  Pain is mostly on the posterior aspect of the hip.  Pain with walking and certain other movements.  She denies any prior right hip injuries.  Patient states she also continues with right knee pain but that is been improving.  She states its close to her baseline but she is getting some different symptoms including catching and a few episodes of instability.  Pain is mostly on the medial aspect of the knee.  Taking meloxicam for relief.    ROS: All systems were reviewed on intake form, form was reviewed and signed. See scanned documents in media. Pertinent positives and negatives included in HPI.    PMH: No pertinent past medical history to this problem  MEDS: Medications were reviewed in Epic  ALLERGIES:   Allergies   Allergen Reactions   • Erythromycin Hives     Hives   • Gluten Meal    • Shrimp (Diagnostic)      SOCHX: Works as a Work and  at [employer]  FH: No pertinent family history to this problem       Objective:     /78   Pulse 71   Temp 36.3 °C (97.3 °F)   Ht 1.626 m (5' 4\")   Wt 112 kg (247 lb)   SpO2 97%   BMI 42.40 kg/m²     Constitutional: Patient is in no acute distress. Appears well-developed and well-nourished.   HENT: Normocephalic and atraumatic. EOM are normal. No scleral icterus.   Cardiovascular: Normal rate.    Pulmonary/Chest: " Effort normal. No respiratory distress.   Neurological: Patient is alert and oriented to person, place, and time.   Skin: Skin is warm and dry.   Psychiatric: Normal mood and affect. Behavior is normal.     Right knee: No gross deformity.  Tenderness over the medial joint line.  Full range of motion.  Anterior/posterior drawer test negative.  No laxity with varus or valgus stress.  Danyelle's positive.  Right hip: No gross deformity.  Tenderness over the right SI joint.  Minimally decreased hip flexion.  CRISPIN test weakly positive.  Antalgic gait.    Assessment/Plan:       1. Hip strain, right, subsequent encounter    2. Knee strain, right, subsequent encounter  - Referral to Physical Therapy    3. Hx of tear of meniscus of knee joint  - Referral to Physical Therapy    Released to Full Duty FROM 4/22/2022 TO 5/13/2022     Expect symptoms to gradually resolve on their own  Furl to physical therapy  Continue meloxicam as prescribed  Okay to use OTC muscle creams/ointments as needed  Okay to use heat/ice as needed  Full duty  Follow-up 3 weeks      Differential diagnosis, natural history, supportive care, and indications for immediate follow-up discussed.    Approximately 35 minutes were spent in reviewing notes, preparing for visit, obtaining history, exam and evaluation, patient counseling/education and post visit documentation/orders.

## 2022-04-22 NOTE — LETTER
[address],   [address] - [place] [address]  Phone:  [phone] - Fax:  [phone]   Occupational Health [facility] Progress Report and Disability Certification  Date of Service: 4/22/2022   No Show:  No  Date / Time of Next Visit: 5/13/2022 @ 9:15am   Claim Information   Patient Name: Isabela Alejandro  Claim Number:     Employer:   [employer] Date of Injury: 4/12/2022     Insurer / TPA: [employer]  ID / SSN:     Occupation: [employer]  Diagnosis: Diagnoses of Hip strain, right, subsequent encounter, Knee strain, right, subsequent encounter, and Hx of tear of meniscus of knee joint were pertinent to this visit.    Medical Information   Related to Industrial Injury? Yes    Subjective Complaints:  DOI: 4/12/22: 57 yo injured worker presents with Right hip and knee injury. MILTON: fall on ice while at work, ice present due sprinklers running in freezing temps. Surface concrete.  She was seen in urgent care x1, x-rays of the right knee were negative for acute findings.  She does have a history of knee pain and meniscal injury, she was evaluated orthopedics about a month ago and elected conservative management of the symptoms.  Patient states that overall the most painful aspect is the right hip.  Pain is mostly on the posterior aspect of the hip.  Pain with walking and certain other movements.  She denies any prior right hip injuries.  Patient states she also continues with right knee pain but that is been improving.  She states its close to her baseline but she is getting some different symptoms including catching and a few episodes of instability.  Pain is mostly on the medial aspect of the knee.  Taking meloxicam for relief.   Objective Findings: Right knee: No gross deformity.  Tenderness over the medial joint line.  Full range of motion.  Anterior/posterior drawer test negative.  No laxity with varus or valgus stress.  Danyelle's  positive.  Right hip: No gross deformity.  Tenderness over the right SI joint.  Minimally decreased hip flexion.  CRISPIN test weakly positive.  Antalgic gait.   Pre-Existing Condition(s):     Assessment:   Condition Same    Status: Additional Care Required  Permanent Disability:No    Plan:      Diagnostics:      Comments:  Expect symptoms to gradually resolve on their own  Furl to physical therapy  Continue meloxicam as prescribed  Okay to use OTC muscle creams/ointments as needed  Okay to use heat/ice as needed  Full duty  Follow-up 3 weeks      Disability Information   Status: Released to Full Duty    From:  4/22/2022  Through: 5/13/2022 Restrictions are:     Physical Restrictions   Sitting:    Standing:    Stooping:    Bending:      Squatting:    Walking:    Climbing:    Pushing:      Pulling:    Other:    Reaching Above Shoulder (L):   Reaching Above Shoulder (R):       Reaching Below Shoulder (L):    Reaching Below Shoulder (R):      Not to exceed Weight Limits   Carrying(hrs):   Weight Limit(lb):   Lifting(hrs):   Weight  Limit(lb):     Comments:      Repetitive Actions   Hands: i.e. Fine Manipulations from Grasping:     Feet: i.e. Operating Foot Controls:     Driving / Operate Machinery:     Health Care Provider’s Original or Electronic Signature  Bobby Guardado D.O. Health Care Provider’s Original or Electronic Signature    Drew Harper MD         Clinic Name / Location: [address] [address] Clinic Phone Number: Dept: [phone]   Appointment Time: 8:30 Am Visit Start Time: 8:24 AM   Check-In Time:  8:16 Am Visit Discharge Time:  8:54am   Original-Treating Physician or Chiropractor    Page 2-Insurer/TPA    Page 3-Employer    Page 4-Employee

## 2022-05-13 ENCOUNTER — OCCUPATIONAL MEDICINE (OUTPATIENT)
Dept: OCCUPATIONAL MEDICINE | Facility: CLINIC | Age: 57
End: 2022-05-13
Payer: COMMERCIAL

## 2022-05-13 VITALS
WEIGHT: 244 LBS | HEIGHT: 64 IN | HEART RATE: 78 BPM | SYSTOLIC BLOOD PRESSURE: 126 MMHG | BODY MASS INDEX: 41.66 KG/M2 | RESPIRATION RATE: 18 BRPM | DIASTOLIC BLOOD PRESSURE: 84 MMHG

## 2022-05-13 DIAGNOSIS — M25.561 ACUTE PAIN OF RIGHT KNEE: ICD-10-CM

## 2022-05-13 DIAGNOSIS — S76.011D HIP STRAIN, RIGHT, SUBSEQUENT ENCOUNTER: ICD-10-CM

## 2022-05-13 DIAGNOSIS — S86.911D KNEE STRAIN, RIGHT, SUBSEQUENT ENCOUNTER: ICD-10-CM

## 2022-05-13 PROCEDURE — 99213 OFFICE O/P EST LOW 20 MIN: CPT | Performed by: PREVENTIVE MEDICINE

## 2022-05-13 RX ORDER — MELOXICAM 15 MG/1
15 TABLET ORAL DAILY
Qty: 30 TABLET | Refills: 0 | Status: SHIPPED | OUTPATIENT
Start: 2022-05-13

## 2022-05-13 NOTE — LETTER
[address],   [address] [place] [address]  Phone:  [phone] - Fax:  [phone]   Occupational Health [facility] Progress Report and Disability Certification  Date of Service: 5/13/2022   No Show:  No  Date / Time of Next Visit: 6/3/2022 @ 8:45AM   Claim Information   Patient Name: Isabela Alejandro  Claim Number:     Employer:   [employer] Date of Injury: 4/12/2022     Insurer / TPA: [employer]  ID / SSN:     Occupation: [employer]  Diagnosis: Diagnoses of Hip strain, right, subsequent encounter, Knee strain, right, subsequent encounter, and Acute pain of right knee were pertinent to this visit.    Medical Information   Related to Industrial Injury? Yes    Subjective Complaints:  DOI: 4/12/22: 55 yo injured worker presents with Right hip and knee injury. MILTON: fall on ice while at work, ice present due sprinklers running in freezing temps. Surface concrete.  X-rays of the right knee were negative for acute findings.  Patient states that overall hip pain is.  Much completely resolved at this point.  However she states she ran out of meloxicam about 12 days ago and since then has had gradual worsening of knee pain.  Patient states that she is working full duty.   Objective Findings: Right knee: No gross deformity.  Tenderness over the medial joint line.  Full range of motion.  Anterior/posterior drawer test negative.  No laxity with varus or valgus stress.  Danyelle's positive.  Right hip: No gross deformity.  Tenderness over the right SI joint.  Minimally decreased hip flexion.  CRISPIN test weakly positive.  Antalgic gait.   Pre-Existing Condition(s):     Assessment:   Condition Same    Status: Additional Care Required  Permanent Disability:No    Plan:      Diagnostics:      Comments:  Physical therapy referral denied pending claim adjudication  Continue meloxicam as prescribed, sent refill  Okay to use OTC muscle  creams/ointments as needed  Okay to use heat/ice as needed  Full duty  Follow-up 3 weeks    Disability Information   Status: Released to Full Duty    From:  5/13/2022  Through: 6/3/2022 Restrictions are:     Physical Restrictions   Sitting:    Standing:    Stooping:    Bending:      Squatting:    Walking:    Climbing:    Pushing:      Pulling:    Other:    Reaching Above Shoulder (L):   Reaching Above Shoulder (R):       Reaching Below Shoulder (L):    Reaching Below Shoulder (R):      Not to exceed Weight Limits   Carrying(hrs):   Weight Limit(lb):   Lifting(hrs):   Weight  Limit(lb):     Comments:      Repetitive Actions   Hands: i.e. Fine Manipulations from Grasping:     Feet: i.e. Operating Foot Controls:     Driving / Operate Machinery:     Health Care Provider’s Original or Electronic Signature  Bobby Guardado D.O. Health Care Provider’s Original or Electronic Signature    Drew Harper MD         Clinic Name / Location: [address]   [address] [place] [address] Clinic Phone Number: Dept: [phone]   Appointment Time: 9:15 Am Visit Start Time: 9:02 AM   Check-In Time:  8:56 Am Visit Discharge Time:  9:28AM   Original-Treating Physician or Chiropractor    Page 2-Insurer/TPA    Page 3-Employer    Page 4-Employee

## 2022-05-13 NOTE — PROGRESS NOTES
"Subjective:     Isabela Alejandro is a 56 y.o. female who presents for Follow-Up (WC DOI 4/12/22 Rt knee same, rm 16)      DOI: 4/12/22: 55 yo injured worker presents with Right hip and knee injury. MILTON: fall on ice while at work, ice present due sprinklers running in freezing temps. Surface concrete.  X-rays of the right knee were negative for acute findings.  Patient states that overall hip pain is.  Much completely resolved at this point.  However she states she ran out of meloxicam about 12 days ago and since then has had gradual worsening of knee pain.  Patient states that she is working full duty.    ROS         Objective:     /84   Pulse 78   Resp 18   Ht 1.626 m (5' 4\")   Wt 111 kg (244 lb)   BMI 41.88 kg/m²     Constitutional: Patient is in no acute distress. Appears well-developed and well-nourished.   Cardiovascular: Normal rate.    Pulmonary/Chest: Effort normal. No respiratory distress.   Neurological: Patient is alert and oriented to person, place, and time.   Skin: Skin is warm and dry.   Psychiatric: Normal mood and affect. Behavior is normal.     Right knee: No gross deformity.  Tenderness over the medial joint line.  Full range of motion.  Anterior/posterior drawer test negative.  No laxity with varus or valgus stress.  Danyelle's positive.  Right hip: No gross deformity.  Tenderness over the right SI joint.  Minimally decreased hip flexion.  CRISPIN test weakly positive.  Antalgic gait.    Assessment/Plan:       1. Hip strain, right, subsequent encounter    2. Knee strain, right, subsequent encounter    3. Acute pain of right knee  - meloxicam (MOBIC) 15 MG tablet; Take 1 Tablet by mouth every day.  Dispense: 30 Tablet; Refill: 0    Released to Full Duty FROM 5/13/2022 TO 6/3/2022       Physical therapy referral denied pending claim adjudication  Continue meloxicam as prescribed, sent refill  Okay to use OTC muscle creams/ointments as needed  Okay to use heat/ice as needed  Full duty  Follow-up " 3 weeks    Differential diagnosis, natural history, supportive care, and indications for immediate follow-up discussed.    Approximately 25 minutes was spent in preparing for visit, obtaining history, exam and evaluation, patient counseling/education and post visit documentation/orders.

## 2022-06-03 ENCOUNTER — OCCUPATIONAL MEDICINE (OUTPATIENT)
Dept: OCCUPATIONAL MEDICINE | Facility: CLINIC | Age: 57
End: 2022-06-03
Payer: COMMERCIAL

## 2022-06-03 VITALS
TEMPERATURE: 96.3 F | WEIGHT: 241 LBS | BODY MASS INDEX: 42.7 KG/M2 | SYSTOLIC BLOOD PRESSURE: 124 MMHG | DIASTOLIC BLOOD PRESSURE: 86 MMHG | OXYGEN SATURATION: 95 % | HEIGHT: 63 IN | HEART RATE: 74 BPM

## 2022-06-03 DIAGNOSIS — S76.011D HIP STRAIN, RIGHT, SUBSEQUENT ENCOUNTER: ICD-10-CM

## 2022-06-03 DIAGNOSIS — S86.911D KNEE STRAIN, RIGHT, SUBSEQUENT ENCOUNTER: ICD-10-CM

## 2022-06-03 PROCEDURE — 99213 OFFICE O/P EST LOW 20 MIN: CPT | Performed by: PREVENTIVE MEDICINE

## 2022-06-03 ASSESSMENT — ENCOUNTER SYMPTOMS
FOCAL WEAKNESS: 0
TINGLING: 0
SENSORY CHANGE: 0

## 2022-06-03 NOTE — PROGRESS NOTES
"Subjective:     Isabela Alejandro is a 56 y.o. female who presents for Other (WC DOI 4/12/22 rt knee, feeling better  room 16)      DOI: 4/12/22: 57 yo injured worker presents with Right hip and knee injury. MILTON: fall on ice while at work, ice present due sprinklers running in freezing temps. Surface concrete.  X-rays of the right knee were negative for acute findings.  Patient states that overall symptoms have been improving gradually.  She states that she is able to walk more and has been taking her dog on walks.  She states that she is still taking the meloxicam and has not tried weaning off of it yet.    Review of Systems   Neurological: Negative for tingling, sensory change and focal weakness.       SOCHX: Works as a workplace  at [employer]  FH: No pertinent family history to this problem.       Objective:     /86   Pulse 74   Temp (!) 35.7 °C (96.3 °F)   Ht 1.6 m (5' 3\")   Wt 109 kg (241 lb)   SpO2 95%   BMI 42.69 kg/m²     Constitutional: Patient is in no acute distress. Appears well-developed and well-nourished.   Cardiovascular: Normal rate.    Pulmonary/Chest: Effort normal. No respiratory distress.   Neurological: Patient is alert and oriented to person, place, and time.   Skin: Skin is warm and dry.   Psychiatric: Normal mood and affect. Behavior is normal.     Right knee: No gross deformity.  Area of pain over the medial knee.  Full range of motion.  Normal gait.      Assessment/Plan:       1. Hip strain, right, subsequent encounter    2. Knee strain, right, subsequent encounter    Released to Full Duty FROM 6/3/2022 TO 6/17/2022       Discontinue meloxicam to assess symptoms without medication  Gradually increase walking as tolerated  Full duty  Follow-up 2 weeks    Differential diagnosis, natural history, supportive care, and indications for immediate follow-up discussed.    Approximately 25 minutes was spent in preparing for visit, obtaining history, exam and " evaluation, patient counseling/education and post visit documentation/orders.

## 2022-06-03 NOTE — LETTER
[address],   [address] [place] [address]  Phone:  [phone] - Fax:  [phone]   Occupational Health [facility] Progress Report and Disability Certification  Date of Service: 6/3/2022   No Show:  No  Date / Time of Next Visit: 6/17/2022 @ 8:30am   Claim Information   Patient Name: Isabela Alejandro  Claim Number:     Employer:   [employer] Date of Injury: 4/12/2022     Insurer / TPA: [employer]  ID / SSN:     Occupation: [employer]  Diagnosis: Diagnoses of Hip strain, right, subsequent encounter and Knee strain, right, subsequent encounter were pertinent to this visit.    Medical Information   Related to Industrial Injury? Yes    Subjective Complaints:  DOI: 4/12/22: 57 yo injured worker presents with Right hip and knee injury. MILTON: fall on ice while at work, ice present due sprinklers running in freezing temps. Surface concrete.  X-rays of the right knee were negative for acute findings.  Patient states that overall symptoms have been improving gradually.  She states that she is able to walk more and has been taking her dog on walks.  She states that she is still taking the meloxicam and has not tried weaning off of it yet.   Objective Findings: Right knee: No gross deformity.  Area of pain over the medial knee.  Full range of motion.  Normal gait.     Pre-Existing Condition(s):     Assessment:   Condition Improved    Status: Additional Care Required  Permanent Disability:No    Plan:      Diagnostics:      Comments:  Discontinue meloxicam to assess symptoms without medication  Gradually increase walking as tolerated  Full duty  Follow-up 2 weeks    Disability Information   Status: Released to Full Duty    From:  6/3/2022  Through: 6/17/2022 Restrictions are:     Physical Restrictions   Sitting:    Standing:    Stooping:    Bending:      Squatting:    Walking:    Climbing:    Pushing:      Pulling:    Other:    Reaching Above  Shoulder (L):   Reaching Above Shoulder (R):       Reaching Below Shoulder (L):    Reaching Below Shoulder (R):      Not to exceed Weight Limits   Carrying(hrs):   Weight Limit(lb):   Lifting(hrs):   Weight  Limit(lb):     Comments:      Repetitive Actions   Hands: i.e. Fine Manipulations from Grasping:     Feet: i.e. Operating Foot Controls:     Driving / Operate Machinery:     Health Care Provider’s Original or Electronic Signature  Bobby Guardado D.O. Health Care Provider’s Original or Electronic Signature    Drew Harper MD         Clinic Name / Location: [address],   [address]  [place] [address] Clinic Phone Number: Dept: [phone]   Appointment Time: 8:45 Am Visit Start Time: 8:44 AM   Check-In Time:  8:40 Am Visit Discharge Time:  9am   Original-Treating Physician or Chiropractor    Page 2-Insurer/TPA    Page 3-Employer    Page 4-Employee

## 2022-06-17 ENCOUNTER — OCCUPATIONAL MEDICINE (OUTPATIENT)
Dept: OCCUPATIONAL MEDICINE | Facility: CLINIC | Age: 57
End: 2022-06-17
Payer: COMMERCIAL

## 2022-06-17 VITALS
WEIGHT: 241 LBS | RESPIRATION RATE: 16 BRPM | HEART RATE: 82 BPM | DIASTOLIC BLOOD PRESSURE: 76 MMHG | OXYGEN SATURATION: 97 % | SYSTOLIC BLOOD PRESSURE: 134 MMHG | BODY MASS INDEX: 42.7 KG/M2 | HEIGHT: 63 IN | TEMPERATURE: 97.2 F

## 2022-06-17 DIAGNOSIS — S76.011D HIP STRAIN, RIGHT, SUBSEQUENT ENCOUNTER: ICD-10-CM

## 2022-06-17 DIAGNOSIS — S86.911D KNEE STRAIN, RIGHT, SUBSEQUENT ENCOUNTER: ICD-10-CM

## 2022-06-17 PROCEDURE — 99212 OFFICE O/P EST SF 10 MIN: CPT | Performed by: PREVENTIVE MEDICINE

## 2022-06-17 NOTE — LETTER
[address],   [address] [place] [address]  Phone:  [phone] - Fax:  [phone]   Occupational Health [facility] Progress Report and Disability Certification  Date of Service: 6/17/2022   No Show:  No  Date / Time of Next Visit:  Release from care   Claim Information   Patient Name: Isabela Alejandro  Claim Number:     Employer:   [employer]  Date of Injury: 4/12/2022     Insurer / TPA: [employer]  ID / SSN:     Occupation: [employer]  Diagnosis: Diagnoses of Hip strain, right, subsequent encounter and Knee strain, right, subsequent encounter were pertinent to this visit.    Medical Information   Related to Industrial Injury? Yes    Subjective Complaints:  DOI: 4/12/22: 57 yo injured worker presents with Right hip and knee injury. MILTON: fall on ice while at work, ice present due sprinklers running in freezing temps. Surface concrete.  X-rays of the right knee were negative for acute findings.  Patient states overall symptoms are much improved.  She states that she has minimal pain at this point.  She has been off the meloxicam and Tylenol for about 10 to 12 days and has not had any worsening symptoms.  She is doing the recumbent bike which does seem to be strengthening her knee.  She feels comfortable with release at this point.   Objective Findings: Right knee: No gross deformity.   Full range of motion.  Normal gait.   Pre-Existing Condition(s):     Assessment:   Condition Improved    Status: Discharged /  MMI  Permanent Disability:No    Plan:      Diagnostics:      Comments:  Expect gradual resolution of symptoms  Released from care  Full duty  If symptoms worsen return to clinic    Disability Information   Status: Released to Full Duty    From:  6/17/2022  Through:   Restrictions are:     Physical Restrictions   Sitting:    Standing:    Stooping:    Bending:      Squatting:    Walking:    Climbing:    Pushing:       Pulling:    Other:    Reaching Above Shoulder (L):   Reaching Above Shoulder (R):       Reaching Below Shoulder (L):    Reaching Below Shoulder (R):      Not to exceed Weight Limits   Carrying(hrs):   Weight Limit(lb):   Lifting(hrs):   Weight  Limit(lb):     Comments:      Repetitive Actions   Hands: i.e. Fine Manipulations from Grasping:     Feet: i.e. Operating Foot Controls:     Driving / Operate Machinery:     Health Care Provider’s Original or Electronic Signature  Bobby Guardado D.O. Health Care Provider’s Original or Electronic Signature    Drew Harper MD         Clinic Name / Location: [address],   [address] [place] [address] Clinic Phone Number: Dept: [phone]   Appointment Time: 8:30 Am Visit Start Time: 8:27 AM   Check-In Time:  8:10 Am Visit Discharge Time:  8:53 am    Original-Treating Physician or Chiropractor    Page 2-Insurer/TPA    Page 3-Employer    Page 4-Employee

## 2022-06-17 NOTE — PROGRESS NOTES
"Subjective:     Isabela Alejandro is a 57 y.o. female who presents for Follow-Up (WC DOI 4/12/22 Right knee.  Has improved since last visit.)      DOI: 4/12/22: 55 yo injured worker presents with Right hip and knee injury. MILTON: fall on ice while at work, ice present due sprinklers running in freezing temps. Surface concrete.  X-rays of the right knee were negative for acute findings.  Patient states overall symptoms are much improved.  She states that she has minimal pain at this point.  She has been off the meloxicam and Tylenol for about 10 to 12 days and has not had any worsening symptoms.  She is doing the recumbent bike which does seem to be strengthening her knee.  She feels comfortable with release at this point.    ROS         Objective:     /76   Pulse 82   Temp 36.2 °C (97.2 °F) (Temporal)   Resp 16   Ht 1.6 m (5' 3\")   Wt 109 kg (241 lb)   SpO2 97%   BMI 42.69 kg/m²     Constitutional: Patient is in no acute distress. Appears well-developed and well-nourished.   Cardiovascular: Normal rate.    Pulmonary/Chest: Effort normal. No respiratory distress.   Neurological: Patient is alert and oriented to person, place, and time.   Skin: Skin is warm and dry.   Psychiatric: Normal mood and affect. Behavior is normal.     Right knee: No gross deformity.   Full range of motion.  Normal gait.    Assessment/Plan:       1. Hip strain, right, subsequent encounter    2. Knee strain, right, subsequent encounter    Released to Full Duty FROM 6/17/2022 TO       Expect gradual resolution of symptoms  Released from care  Full duty  If symptoms worsen return to clinic    Differential diagnosis, natural history, supportive care, and indications for immediate follow-up discussed.    Approximately 15 minutes was spent in preparing for visit, obtaining history, exam and evaluation, patient counseling/education and post visit documentation/orders.  "

## 2022-08-20 ENCOUNTER — OFFICE VISIT (OUTPATIENT)
Dept: URGENT CARE | Facility: PHYSICIAN GROUP | Age: 57
End: 2022-08-20
Payer: COMMERCIAL

## 2022-08-20 VITALS
TEMPERATURE: 98.3 F | HEART RATE: 83 BPM | SYSTOLIC BLOOD PRESSURE: 124 MMHG | RESPIRATION RATE: 16 BRPM | DIASTOLIC BLOOD PRESSURE: 86 MMHG | WEIGHT: 248 LBS | BODY MASS INDEX: 43.93 KG/M2 | OXYGEN SATURATION: 96 %

## 2022-08-20 DIAGNOSIS — R00.2 HEART PALPITATIONS: ICD-10-CM

## 2022-08-20 LAB — GLUCOSE BLD-MCNC: 112 MG/DL (ref 70–100)

## 2022-08-20 PROCEDURE — 99215 OFFICE O/P EST HI 40 MIN: CPT | Performed by: FAMILY MEDICINE

## 2022-08-20 PROCEDURE — 82962 GLUCOSE BLOOD TEST: CPT | Performed by: FAMILY MEDICINE

## 2022-08-20 PROCEDURE — 93000 ELECTROCARDIOGRAM COMPLETE: CPT | Performed by: FAMILY MEDICINE

## 2022-08-20 NOTE — PROGRESS NOTES
"Subjective:     Chief Complaint   Patient presents with    Irregular Heart Beat     Heart feels like its \"fluttering\" for a few hours.             Palpitations   This is a new problem.       C/o \"skipped beats\" - first noticed approx 90 min ago and was just watching television- not exerting herself.       The problem has been unchanged.   States that she feels short of breath, but denies chest pain.       She has hx of mild asthma, but denies wheezing or cough.    Denies fever or chills.          Past Medical History:   Diagnosis Date    Asthma     Chickenpox        Social History     Tobacco Use    Smoking status: Never    Smokeless tobacco: Never   Vaping Use    Vaping Use: Never used   Substance Use Topics    Alcohol use: Yes     Comment: Rarely     Drug use: Not Currently             Review of Systems   Constitutional: Positive for malaise/fatigue.    Respiratory: Negative for cough.    Cardiovascular: Positive for palpitations. Negative for chest pain.   Psychiatric/Behavioral: positive for anxiety  All other systems reviewed and are negative.         Objective:    /86   Pulse 83   Temp 36.8 °C (98.3 °F)   Resp 16   Wt 112 kg (248 lb)   SpO2 96%         Physical Exam   Constitutional: pt is oriented to person, place, and time. Pt appears well-developed. No distress.   HENT:   Head: Normocephalic and atraumatic.   Eyes: Conjunctivae and EOM are normal. Pupils are equal, round, and reactive to light. Right conjunctiva is not injected. Left conjunctiva is not injected.      Cardiovascular: Normal rate, regular rhythm and normal heart sounds.  Exam reveals no friction rub.    No murmur heard.  Pulmonary/Chest: Effort normal and breath sounds normal. No respiratory distress. Pt has no wheezes or rales.   Neurological: pt is alert and oriented to person, place, and time. No cranial nerve deficit.   Skin: Skin is warm. Pt is not diaphoretic. No erythema.   Psychiatric: pt's mood appears anxious.   Nursing " note and vitals reviewed.      EKG interpretation - normal sinus rhythm. Early R-wave progression.  No ST or QRS morphology changes. QT interval is normal. Axis is positive. No signs of ischemia.          Assessment/Plan:     1. Heart palpitations  Vital signs are all within range     Blood sugar was: 115    EKG was reassuring, but there was early R-wave progression.   This is a nonspecific finding.     I recommended further evaluation in the ED.     Pt refused transfer to ED.   Risks explained and pt voiced understanding.       Labs ordered    Referred to cardiology - will likely need Holter monitor.        - EKG - Clinic Performed  - POCT Glucose  - CBC WITH DIFFERENTIAL; Future  - Comp Metabolic Panel; Future  - TSH  - D-DIMER; Future  - REFERRAL TO CARDIOLOGY          My total time spent caring for the patient on the day of the encounter was 40 minutes.   This does not include time spent on separately billable procedures/tests.
